# Patient Record
Sex: FEMALE | Race: WHITE | NOT HISPANIC OR LATINO | Employment: FULL TIME | URBAN - METROPOLITAN AREA
[De-identification: names, ages, dates, MRNs, and addresses within clinical notes are randomized per-mention and may not be internally consistent; named-entity substitution may affect disease eponyms.]

---

## 2017-03-23 ENCOUNTER — THERAPY VISIT (OUTPATIENT)
Dept: CHIROPRACTIC MEDICINE | Facility: CLINIC | Age: 52
End: 2017-03-23
Payer: COMMERCIAL

## 2017-03-23 DIAGNOSIS — M79.10 MYALGIA: ICD-10-CM

## 2017-03-23 DIAGNOSIS — M99.05 SOMATIC DYSFUNCTION OF PELVIS REGION: Primary | ICD-10-CM

## 2017-03-23 DIAGNOSIS — M99.06 SOMATIC DYSFUNCTION OF LOWER EXTREMITIES: ICD-10-CM

## 2017-03-23 DIAGNOSIS — M76.60 ACHILLES TENDON PAIN: ICD-10-CM

## 2017-03-23 PROCEDURE — 98940 CHIROPRACT MANJ 1-2 REGIONS: CPT | Mod: AT | Performed by: CHIROPRACTOR

## 2017-03-23 PROCEDURE — 97110 THERAPEUTIC EXERCISES: CPT | Performed by: CHIROPRACTOR

## 2017-03-23 NOTE — MR AVS SNAPSHOT
"              After Visit Summary   3/23/2017    Ale Rose    MRN: [id number]           Patient Information     Date Of Birth          1965        Visit Information        Provider Department      3/23/2017 5:00 PM Hesham Rossi DC [facility] - [place] Chiropractor        Today's Diagnoses     Somatic dysfunction of pelvis region    -  1    Somatic dysfunction of lower extremities        Myalgia        Achilles tendon pain           Follow-ups after your visit        Your next 10 appointments already scheduled     Apr 07, 2017  4:45 PM CDT   [facility] Chiropractor with Hesham Rossi DC   [facility] Athletic [facility] [place] Chiropractor (RAKEL [place])    [address] [place] [address]  [address]  [place] [address] [phone]              Who to contact     If you have questions or need follow up information about today's clinic visit or your schedule please contact [facility] ATHLETIC [facility] [place] CHIROPRACTOR directly at [phone].  Normal or non-critical lab and imaging results will be communicated to you by Curate.Ushart, letter or phone within 4 business days after the clinic has received the results. If you do not hear from us within 7 days, please contact the clinic through Curate.Ushart or phone. If you have a critical or abnormal lab result, we will notify you by phone as soon as possible.  Submit refill requests through We Tribute or call your pharmacy and they will forward the refill request to us. Please allow 3 business days for your refill to be completed.          Additional Information About Your Visit        Curate.Ushart Information     We Tribute lets you send messages to your doctor, view your test results, renew your prescriptions, schedule appointments and more. To sign up, go to [email or web address] . Click on \"Log in\" on the left side of the screen, which will take you to the Welcome page. Then click on \"Sign up Now\" on the right side of " the page.     You will be asked to enter the access code listed below, as well as some personal information. Please follow the directions to create your username and password.     Your access code is: [email or web address]  Expires: 2017  4:20 PM     Your access code will  in 90 days. If you need help or a new code, please call your [place] clinic or [phone].        Care EveryWhere ID     This is your Care EveryWhere ID. This could be used by other organizations to access your [place] medical records  [id number]         Blood Pressure from Last 3 Encounters:   16 128/84   13 112/74   10/25/12 107/67    Weight from Last 3 Encounters:   16 62.6 kg (138 lb)   13 64.9 kg (143 lb)   10/25/12 64.2 kg (141 lb 9.6 oz)              We Performed the Following     CHIROPRAC MANIP,SPINAL,1-2 REGIONS     THERAPEUTIC EXERCISES        Primary Care Provider    Md Other Clinic                Thank you!     Thank you for choosing [place] FOR ATHLETIC MEDICINE [facility] CHIROPRACTOR  for your care. Our goal is always to provide you with excellent care. Hearing back from our patients is one way we can continue to improve our services. Please take a few minutes to complete the written survey that you may receive in the mail after your visit with us. Thank you!             Your Updated Medication List - Protect others around you: Learn how to safely use, store and throw away your medicines at www.disposemymeds.org.          This list is accurate as of: 3/23/17 11:59 PM.  Always use your most recent med list.                   Brand Name Dispense Instructions for use    clindamycin 150 MG capsule    CLEOCIN    21 capsule    Take 1 capsule (150 mg) by mouth 3 times daily       GLUCOSAMINE CHONDR 1500 COMPLX PO      Take 1 tablet by mouth daily.       HYDROcodone-acetaminophen 5-325 MG per tablet    NORCO    30 tablet    Take 1-2 tabs every 6 hours as needed for pain       IBUPROFEN PO           levothyroxine 75 MCG tablet    SYNTHROID/LEVOTHROID     Take 75 mcg by mouth daily.       NASONEX 50 MCG/ACT spray   Generic drug:  mometasone      Spray 2 sprays into both nostrils daily.

## 2017-03-26 NOTE — PROGRESS NOTES
Subjective:  CC: R hamstring, R calf  Visit: 9     Goal: Run back to back days without increase in calf and hamstring tightness, sit for 1 hour before back discomfort   Comes in today doing well. She just raced 10 mile and has another race this weekend. Notes her training has gone very well for [place]. She just had another shock wave treatment and notes that is feeling good. Notes some right hamstring and hip tightness. Overall pleased with progress. Also notes some left lateral hip tightness post run. She has been able to maintain how she has been doing with help of massage therpapy.     Objective:  Inspection:  Moderate thickening over right achilles   No Scars  Early heel lift on right when walking     Palpation:  Palpable soreness over R popliteus, R achilles, R calf, R hamstring  Myofascitis 2/4 noted over R gastroc, R soleus, R hasmtring    ROM:  Hip IR limited on left 5 degrees compared to right  Ankle DF limited on right with mild discomfort on right   Back extension 30/30 with mild lower back pain  Hip adduction full and pain free  SLR symmetric  tightness noted over right hamstring     MMT:  Left glute med 4/5 with no pain  Right glute med 4/5 with no pain  TFL 4/5 on L with lateral hip discomfort  Heel raise 4/5 with pain over right achilles     MET:  Left out flare    NAL:  Restricted SI on right  Restricted talus R    Ortho: SLR (tightness only)    Assessment:  NAL with associated myofascitis and weakness   Achilles tendinitis     Plan:   Patient tolerated treatment well today  Treatment Time: 45 minutes  27859 manipulation 1-2 segments: MET  77333 manipulation: ankle LAD  13306 Manual therapy: (ART, Graston, Strain Counter Strain, Fascial Manipulation, Cupping) performed over area of bilateral hamstrings, R calf, R lateral quad, L TFL  81198 therapeutic exercise (20 minutes):   1. Calf stretching for gastroc and soleus  2. Self plantar stretching  3. Single leg balance on [place] cushion  4. TFL  stretching  5. Lateral hamstring stretching  6. Standing TFL stretching  7. side lying stretching over stability ball, piriformis stretching, marching bridges with ball squeeze   8.  straight leg bridges on stability ball, active isolated hamstring stretches, seated piriformis stretch  9.  only worked lower extremity stretching over quads, hamstrings, TFL, psoas.   10.  hip out flare exercises: left hip abduction, standing running pose with abduction, bridge with abduction, self active isolated stretching. We did review single leg step back and added some insight to make sure she feels it in her glute. Also review current PT exercises   11. she is seeing PT. Today we focused on hip stretching (TFL, hip ER). Condensed exercises down as she notes she is spending 1 hour a day doing them. We focused on stretching and hip extension exercises for glute strength. Added eccentric calfs   12.  single leg balance with opposite arm row, walking lunge with heel raise for balance, single leg paw drill, single leg bridge, figure 4 stretch  Today: single leg bride, clam shell, side lying abduction, lateral band walks  Shock wave 2000 impulses over right achilles 15/8 (did not do today)  Strapping:  Hip IR L, ankle mulligan on R  Next visit: 2 weeks  Dry Needling: achilles and calf (did not do today)

## 2017-04-04 ENCOUNTER — THERAPY VISIT (OUTPATIENT)
Dept: CHIROPRACTIC MEDICINE | Facility: CLINIC | Age: 52
End: 2017-04-04
Payer: COMMERCIAL

## 2017-04-04 DIAGNOSIS — M99.05 SOMATIC DYSFUNCTION OF PELVIS REGION: ICD-10-CM

## 2017-04-04 DIAGNOSIS — M79.10 MYALGIA: ICD-10-CM

## 2017-04-04 DIAGNOSIS — M76.60 ACHILLES TENDON PAIN: ICD-10-CM

## 2017-04-04 DIAGNOSIS — M99.06 SOMATIC DYSFUNCTION OF LOWER EXTREMITIES: ICD-10-CM

## 2017-04-04 PROCEDURE — 97110 THERAPEUTIC EXERCISES: CPT | Performed by: CHIROPRACTOR

## 2017-04-04 PROCEDURE — 98940 CHIROPRACT MANJ 1-2 REGIONS: CPT | Mod: AT | Performed by: CHIROPRACTOR

## 2017-04-04 NOTE — MR AVS SNAPSHOT
"              After Visit Summary   4/4/2017    Ale Rose    MRN: [id number]           Patient Information     Date Of Birth          1965        Visit Information        Provider Department      4/4/2017 4:45 PM Hesham Rossi DC [place] [facility] - [place] Chiropractor        Today's Diagnoses     Somatic dysfunction of pelvis region        Somatic dysfunction of lower extremities        Myalgia        Achilles tendon pain           Follow-ups after your visit        Your next 10 appointments already scheduled     Apr 07, 2017  4:45 PM CDT   [facility] Chiropractor with Hesham Rossi DC   [facility] Athletic [facility] - [place] Chiropractor (RAKEL [place])    [address] [place] [address]  [address]  [place] [address] [phone]              Who to contact     If you have questions or need follow up information about today's clinic visit or your schedule please contact [facility] ATHLETIC [facility] [place] CHIROPRACTOR directly at [phone].  Normal or non-critical lab and imaging results will be communicated to you by saambaahart, letter or phone within 4 business days after the clinic has received the results. If you do not hear from us within 7 days, please contact the clinic through Accumuli Securityt or phone. If you have a critical or abnormal lab result, we will notify you by phone as soon as possible.  Submit refill requests through OneDoc or call your pharmacy and they will forward the refill request to us. Please allow 3 business days for your refill to be completed.          Additional Information About Your Visit        saambaahart Information     OneDoc lets you send messages to your doctor, view your test results, renew your prescriptions, schedule appointments and more. To sign up, go to [email or web address] . Click on \"Log in\" on the left side of the screen, which will take you to the Welcome page. Then click on \"Sign up Now\" on the right side of the " page.     You will be asked to enter the access code listed below, as well as some personal information. Please follow the directions to create your username and password.     Your access code is: [email or web address]  Expires: 2017  4:20 PM     Your access code will  in 90 days. If you need help or a new code, please call your [place] clinic or [phone].        Care EveryWhere ID     This is your Care EveryWhere ID. This could be used by other organizations to access your [place] medical records  [id number]         Blood Pressure from Last 3 Encounters:   16 128/84   13 112/74   10/25/12 107/67    Weight from Last 3 Encounters:   16 62.6 kg (138 lb)   13 64.9 kg (143 lb)   10/25/12 64.2 kg (141 lb 9.6 oz)              Today, you had the following     No orders found for display       Primary Care Provider    Md Other Clinic                Thank you!     Thank you for [facility] FOR [facility]THLETIC MEDICINE [facility] CHIROPRACTOR  for your care. Our goal is always to provide you with excellent care. Hearing back from our patients is one way we can continue to improve our services. Please take a few minutes to complete the written survey that you may receive in the mail after your visit with us. Thank you!             Your Updated Medication List - Protect others around you: Learn how to safely use, store and throw away your medicines at www.disposemymeds.org.          This list is accurate as of: 17  8:18 PM.  Always use your most recent med list.                   Brand Name Dispense Instructions for use    clindamycin 150 MG capsule    CLEOCIN    21 capsule    Take 1 capsule (150 mg) by mouth 3 times daily       GLUCOSAMINE CHONDR 1500 COMPLX PO      Take 1 tablet by mouth daily.       HYDROcodone-acetaminophen 5-325 MG per tablet    NORCO    30 tablet    Take 1-2 tabs every 6 hours as needed for pain       IBUPROFEN PO          levothyroxine 75 MCG tablet     SYNTHROID/LEVOTHROID     Take 75 mcg by mouth daily.       NASONEX 50 MCG/ACT spray   Generic drug:  mometasone      Spray 2 sprays into both nostrils daily.

## 2017-05-16 ENCOUNTER — THERAPY VISIT (OUTPATIENT)
Dept: CHIROPRACTIC MEDICINE | Facility: CLINIC | Age: 52
End: 2017-05-16
Payer: COMMERCIAL

## 2017-05-16 DIAGNOSIS — M99.05 SOMATIC DYSFUNCTION OF PELVIS REGION: Primary | ICD-10-CM

## 2017-05-16 DIAGNOSIS — M99.06 SOMATIC DYSFUNCTION OF LOWER EXTREMITIES: ICD-10-CM

## 2017-05-16 DIAGNOSIS — M79.10 MYALGIA: ICD-10-CM

## 2017-05-16 DIAGNOSIS — M76.60 ACHILLES TENDON PAIN: ICD-10-CM

## 2017-05-16 PROCEDURE — 98940 CHIROPRACT MANJ 1-2 REGIONS: CPT | Mod: AT | Performed by: CHIROPRACTOR

## 2017-05-16 PROCEDURE — 97110 THERAPEUTIC EXERCISES: CPT | Performed by: CHIROPRACTOR

## 2017-05-16 NOTE — MR AVS SNAPSHOT
"              After Visit Summary   5/16/2017    Ale Rose    MRN: [id number]           Patient Information     Date Of Birth          1965        Visit Information        Provider Department      5/16/2017 3:15 PM Hesham Rossi DC [facility] Athletic [facility] - [place] Chiropractor        Today's Diagnoses     Somatic dysfunction of pelvis region    -  1    Somatic dysfunction of lower extremities        Myalgia        Achilles tendon pain           Follow-ups after your visit        Your next 10 appointments already scheduled     May 30, 2017  4:00 PM CDT   [facility] Chiropractor with Hesham Rossi DC   [facility] Athletic [facility] [place] Chiropractor (RAKEL [place])    [address] [place] [address]  [phone]  [place] [address] [phone]              Who to contact     If you have questions or need follow up information about today's clinic visit or your schedule please contact [facility] ATHLETIC [facility] [place] CHIROPRACTOR directly at [phone].  Normal or non-critical lab and imaging results will be communicated to you by Aptanahart, letter or phone within 4 business days after the clinic has received the results. If you do not hear from us within 7 days, please contact the clinic through Aptanahart or phone. If you have a critical or abnormal lab result, we will notify you by phone as soon as possible.  Submit refill requests through [address] or call your pharmacy and they will forward the refill request to us. Please allow 3 business days for your refill to be completed.          Additional Information About Your Visit        Aptanahart Information     [employer] [employer] lets you send messages to your doctor, view your test results, renew your prescriptions, schedule appointments and more. To sign up, go to [email or web address] [email or web address] . Click on \"Log in\" on the left side of the screen, which will take you to the Welcome page. Then click on \"Sign up Now\" on the right side of " the page.     You will be asked to enter the access code listed below, as well as some personal information. Please follow the directions to create your username and password.     Your access code is: [email or web address]  Expires: 2017  4:20 PM     Your access code will  in 90 days. If you need help or a new code, please call your [place] clinic or [phone].        Care EveryWhere ID     This is your Care EveryWhere ID. This could be used by other organizations to access your [place] medical records  [id number]         Blood Pressure from Last 3 Encounters:   16 128/84   13 112/74   10/25/12 107/67    Weight from Last 3 Encounters:   16 62.6 kg (138 lb)   13 64.9 kg (143 lb)   10/25/12 64.2 kg (141 lb 9.6 oz)              We Performed the Following     CHIROPRAC MANIP,SPINAL,1-2 REGIONS     THERAPEUTIC EXERCISES        Primary Care Provider    Md Other Clinic                Thank you!     Thank you for choosing [place] FOR ATHLETIC MEDICINE [facility] CHIROPRACTOR  for your care. Our goal is always to provide you with excellent care. Hearing back from our patients is one way we can continue to improve our services. Please take a few minutes to complete the written survey that you may receive in the mail after your visit with us. Thank you!             Your Updated Medication List - Protect others around you: Learn how to safely use, store and throw away your medicines at www.disposemymeds.org.          This list is accurate as of: 17 11:59 PM.  Always use your most recent med list.                   Brand Name Dispense Instructions for use    clindamycin 150 MG capsule    CLEOCIN    21 capsule    Take 1 capsule (150 mg) by mouth 3 times daily       GLUCOSAMINE CHONDR 1500 COMPLX PO      Take 1 tablet by mouth daily.       HYDROcodone-acetaminophen 5-325 MG per tablet    NORCO    30 tablet    Take 1-2 tabs every 6 hours as needed for pain       IBUPROFEN PO           levothyroxine 75 MCG tablet    SYNTHROID/LEVOTHROID     Take 75 mcg by mouth daily.       NASONEX 50 MCG/ACT spray   Generic drug:  mometasone      Spray 2 sprays into both nostrils daily.

## 2017-05-21 NOTE — PROGRESS NOTES
Subjective:  CC: R hamstring, R calf  Visit: 11    Goal: Run back to back days without increase in calf and hamstring tightness, sit for 1 hour before back discomfort   Comes in today doing well. She finished [email or web address] and has ran 2 other races since. She is very pleased with how she has felt. She notes some left hip tightness, right hamstring and right calf tightness. Achilles is doing well. Has been getting some massages that seem to help as well. Most pain is after speed running and sitting for long periods. She has responded well to care in past. She denies any new issues.     Objective:  Inspection:  Moderate thickening over right achilles   No Scars  Normal gait    Palpation:  Palpable soreness over R popliteus, R achilles, R calf, R hamstring, L lateral hip   Myofascitis 2/4 noted over R gastroc, R soleus, R hamstring, L TFL    ROM:  Hip IR limited on left 5 degrees compared to right  Ankle DF limited on right with mild discomfort on right   Back extension 30/30 with mild lower back pain  Hip adduction full and pain free  SLR symmetric  tightness noted over L hamstring     MMT:  Left glute med 4/5 with no pain  Right glute med 4/5 with no pain  TFL 4/5 on L with lateral hip discomfort  Heel raise 5/5 with pain over right achilles   Hamstrings 5/5 B with mild discomfort over left hamstring     MET:  Left out flare    NAL:  Restricted SI on right  Restricted talus R    Ortho: SLR (tightness only)    Assessment:  NAL with associated myofascitis and weakness   Achilles tendinitis     Plan:   Patient tolerated treatment well today  Treatment Time: 45 minutes  39897 manipulation 1-2 segments: MET  49534 manipulation: ankle LAD  16497 Manual therapy: (ART, Graston, Strain Counter Strain, Fascial Manipulation, Cupping) performed over area of bilateral hamstrings, R calf, R lateral quad, L TFL, B hamstring  42945 therapeutic exercise (20 minutes):   1. Calf stretching for gastroc and soleus  2. Self plantar  stretching  3. Single leg balance on [place] cushion  4. TFL stretching  5. Lateral hamstring stretching  6. Standing TFL stretching  7. side lying stretching over stability ball, piriformis stretching, marching bridges with ball squeeze   8.  straight leg bridges on stability ball, active isolated hamstring stretches, seated piriformis stretch  9.  only worked lower extremity stretching over quads, hamstrings, TFL, psoas.   10.  hip out flare exercises: left hip abduction, standing running pose with abduction, bridge with abduction, self active isolated stretching. We did review single leg step back and added some insight to make sure she feels it in her glute. Also review current PT exercises   11. she is seeing PT. Today we focused on hip stretching (TFL, hip ER). Condensed exercises down as she notes she is spending 1 hour a day doing them. We focused on stretching and hip extension exercises for glute strength. Added eccentric calfs   12.  single leg balance with opposite arm row, walking lunge with heel raise for balance, single leg paw drill, single leg bridge, figure 4 stretch  Today: single leg bride, clam shell, side lying abduction, lateral band walks  Shock wave 2000 impulses over right achilles 15/8 (did not do today)  Strapping:  Hip IR L, ankle mulligan on R  Next visit: PRN, after marathon  Dry Needling: achilles and calf (did not do today)

## 2017-05-30 ENCOUNTER — THERAPY VISIT (OUTPATIENT)
Dept: CHIROPRACTIC MEDICINE | Facility: CLINIC | Age: 52
End: 2017-05-30
Payer: COMMERCIAL

## 2017-05-30 DIAGNOSIS — M99.05 SOMATIC DYSFUNCTION OF PELVIS REGION: Primary | ICD-10-CM

## 2017-05-30 DIAGNOSIS — M76.60 ACHILLES TENDON PAIN: ICD-10-CM

## 2017-05-30 DIAGNOSIS — M99.06 SOMATIC DYSFUNCTION OF LOWER EXTREMITIES: ICD-10-CM

## 2017-05-30 DIAGNOSIS — M79.10 MYALGIA: ICD-10-CM

## 2017-05-30 PROCEDURE — 98940 CHIROPRACT MANJ 1-2 REGIONS: CPT | Mod: AT | Performed by: CHIROPRACTOR

## 2017-05-30 PROCEDURE — 97110 THERAPEUTIC EXERCISES: CPT | Performed by: CHIROPRACTOR

## 2017-05-30 NOTE — MR AVS SNAPSHOT
"              After Visit Summary   2017    Ale Rose    MRN: [id number]           Patient Information     Date Of Birth          1965        Visit Information        Provider Department      2017 4:00 PM Hesham Rossi DC [place] [facility] [place] Chiropractor        Today's Diagnoses     Somatic dysfunction of pelvis region    -  1    Somatic dysfunction of lower extremities        Myalgia        Achilles tendon pain           Follow-ups after your visit        Who to contact     If you have questions or need follow up information about today's clinic visit or your schedule please contact [place] [facility] [place] CHIROPRACTOR directly at [phone].  Normal or non-critical lab and imaging results will be communicated to you by Celeryhart, letter or phone within 4 business days after the clinic has received the results. If you do not hear from us within 7 days, please contact the clinic through Celeryhart or phone. If you have a critical or abnormal lab result, we will notify you by phone as soon as possible.  Submit refill requests through TaskEasy or call your pharmacy and they will forward the refill request to us. Please allow 3 business days for your refill to be completed.          Additional Information About Your Visit        MyChart Information     TaskEasy lets you send messages to your doctor, view your test results, renew your prescriptions, schedule appointments and more. To sign up, go to [email or web address] . Click on \"Log in\" on the left side of the screen, which will take you to the Welcome page. Then click on \"Sign up Now\" on the right side of the page.     You will be asked to enter the access code listed below, as well as some personal information. Please follow the directions to create your username and password.     Your access code is: [email or web address]  Expires: 2017  4:20 PM     Your access code will  in 90 days. " If you need help or a new code, please call your [place] clinic or [phone].        Care EveryWhere ID     This is your Care EveryWhere ID. This could be used by other organizations to access your [place] medical records  [id number]         Blood Pressure from Last 3 Encounters:   01/07/16 128/84   01/03/13 112/74   10/25/12 107/67    Weight from Last 3 Encounters:   01/07/16 62.6 kg (138 lb)   01/03/13 64.9 kg (143 lb)   10/25/12 64.2 kg (141 lb 9.6 oz)              We Performed the Following     CHIROPRAC MANIP,SPINAL,1-2 REGIONS     THERAPEUTIC EXERCISES        Primary Care Provider    Md Other Clinic                Thank you!     Thank you for choosing [place] FOR [facility]IC MED[facility]  for your care. Our goal is always to provide you with excellent care. Hearing back from our patients is one way we can continue to improve our services. Please take a few minutes to complete the written survey that you may receive in the mail after your visit with us. Thank you!             Your Updated Medication List - Protect others around you: Learn how to safely use, store and throw away your medicines at www.disposemymeds.org.          This list is accurate as of: 5/30/17  9:06 PM.  Always use your most recent med list.                   Brand Name Dispense Instructions for use    clindamycin 150 MG capsule    CLEOCIN    21 capsule    Take 1 capsule (150 mg) by mouth 3 times daily       GLUCOSAMINE CHONDR 1500 COMPLX PO      Take 1 tablet by mouth daily.       HYDROcodone-acetaminophen 5-325 MG per tablet    NORCO    30 tablet    Take 1-2 tabs every 6 hours as needed for pain       IBUPROFEN PO          levothyroxine 75 MCG tablet    SYNTHROID/LEVOTHROID     Take 75 mcg by mouth daily.       NASONEX 50 MCG/ACT spray   Generic drug:  mometasone      Spray 2 sprays into both nostrils daily.

## 2017-05-31 NOTE — PROGRESS NOTES
Subjective:  CC: R hamstring, R calf, L hop   Visit: 12    Goal: Run back to back days without increase in calf and hamstring tightness, sit for 1 hour before back discomfort   Comes in today doing well. Notes that she raced 5K yesterday and racing 24 hour race this weekend. Notes was sore last week over left lateral hip from massage, but notes better. Today. Note right hamstring is little tight, but denies any new issues. Notes has been running well. Denies any changes in medications.     Objective:  Inspection:  Moderate thickening over right achilles   No Scars  Normal gait    Palpation:  Palpable soreness over R popliteus, R achilles, R calf, R hamstring, L lateral hip   Myofascitis 2/4 noted over R gastroc, R soleus, R hamstring, L TFL    ROM:  Hip IR limited on left 5 degrees compared to right  Ankle DF limited on right with mild discomfort on right   Back extension 30/30 with mild lower back pain  Hip adduction full and pain free  SLR symmetric  tightness noted over L hamstring     MMT:  Left glute med 4/5 with no pain  Right glute med 4/5 with no pain  TFL 4/5 on L with lateral hip discomfort  Heel raise 5/5 with pain over right achilles   Hamstrings 5/5 B with mild discomfort over left hamstring     MET:  Left out flare    NAL:  Restricted SI on right  Restricted talus R    Ortho: SLR (tightness only)    Assessment:  NAL with associated myofascitis and weakness   Achilles tendinitis     Plan:   Patient tolerated treatment well today  Treatment Time: 45 minutes  49104 manipulation 1-2 segments: MET  21083 manipulation: ankle LAD  78875 Manual therapy: (ART, Graston, Strain Counter Strain, Fascial Manipulation, Cupping) performed over area of bilateral hamstrings, R calf, R lateral quad, L TFL, B hamstring  72340 therapeutic exercise (20 minutes):   1. Calf stretching for gastroc and soleus  2. Self plantar stretching  3. Single leg balance on [place] cushion  4. TFL stretching  5. Lateral hamstring  stretching  6. Standing TFL stretching  7. side lying stretching over stability ball, piriformis stretching, marching bridges with ball squeeze   8.  straight leg bridges on stability ball, active isolated hamstring stretches, seated piriformis stretch  9.  only worked lower extremity stretching over quads, hamstrings, TFL, psoas.   10.  hip out flare exercises: left hip abduction, standing running pose with abduction, bridge with abduction, self active isolated stretching. We did review single leg step back and added some insight to make sure she feels it in her glute. Also review current PT exercises   11. she is seeing PT. Today we focused on hip stretching (TFL, hip ER). Condensed exercises down as she notes she is spending 1 hour a day doing them. We focused on stretching and hip extension exercises for glute strength. Added eccentric calfs   12.  single leg balance with opposite arm row, walking lunge with heel raise for balance, single leg paw drill, single leg bridge, figure 4 stretch  Today: single leg bride, clam shell, side lying abduction, lateral band walks, thoracic rotation   Shock wave 2000 impulses over right achilles 15/8 (did not do today)  Strapping:  Hip IR L, ankle mulligan on R  Next visit: PRN, after marathon  Dry Needling: achilles and calf (did not do today)

## 2017-07-24 ENCOUNTER — THERAPY VISIT (OUTPATIENT)
Dept: CHIROPRACTIC MEDICINE | Facility: CLINIC | Age: 52
End: 2017-07-24
Payer: COMMERCIAL

## 2017-07-24 DIAGNOSIS — M99.06 SOMATIC DYSFUNCTION OF LOWER EXTREMITIES: ICD-10-CM

## 2017-07-24 DIAGNOSIS — M79.10 MYALGIA: ICD-10-CM

## 2017-07-24 DIAGNOSIS — M76.60 ACHILLES TENDON PAIN: ICD-10-CM

## 2017-07-24 DIAGNOSIS — M99.05 SOMATIC DYSFUNCTION OF PELVIS REGION: Primary | ICD-10-CM

## 2017-07-24 PROCEDURE — 97110 THERAPEUTIC EXERCISES: CPT | Performed by: CHIROPRACTOR

## 2017-07-24 PROCEDURE — 98940 CHIROPRACT MANJ 1-2 REGIONS: CPT | Mod: AT | Performed by: CHIROPRACTOR

## 2017-07-24 NOTE — MR AVS SNAPSHOT
After Visit Summary   7/24/2017    Ale Rose    MRN: [id number]           Patient Information     Date Of Birth          1965        Visit Information        Provider Department      7/24/2017 5:45 PM Hesham Rossi DC [facility] [place] Chiropractor        Today's Diagnoses     Somatic dysfunction of pelvis region    -  1    Somatic dysfunction of lower extremities        Myalgia        Achilles tendon pain           Follow-ups after your visit        Your next 10 appointments already scheduled     Aug 15, 2017  9:30 AM CDT   [facility] Chiropractor with Hesham Rossi DC   [facility] [place] [facility] ([facility] [place])    [address] [place] [address]  [phone]  [place] [address]   [phone]            Sep 18, 2017  4:00 PM CDT   RAKEL Chiropractor with Hesham Rossi DC   [facility] [place] Chiropractor ([facility] [place])    [address] [place] [address]  [address]  [place] [address]   [phone]              Who to contact     If you have questions or need follow up information about today's clinic visit or your schedule please contact [facility] directly at [phone].  Normal or non-critical lab and imaging results will be communicated to you by PenBladehart, letter or phone within 4 business days after the clinic has received the results. If you do not hear from us within 7 days, please contact the clinic through Pen[employer] or phone. If you have a critical or abnormal lab result, we will notify you by phone as soon as possible.  Submit refill requests through [employer] or call your pharmacy and they will forward the refill request to us. Please allow 3 business days for your refill to be completed.          Additional Information About Your Visit        PenBladeharRecommend Information     [employer] lets you send messages to your doctor, view your test results, renew  "your prescriptions, schedule appointments and more. To sign up, go to [email or web address] . Click on \"Log in\" on the left side of the screen, which will take you to the Welcome page. Then click on \"Sign up Now\" on the right side of the page.     You will be asked to enter the access code listed below, as well as some personal information. Please follow the directions to create your username and password.     Your access code is: [email or web address]  Expires: 10/22/2017  9:28 PM     Your access code will  in 90 days. If you need help or a new code, please call your [place] clinic or [phone].        Care EveryWhere ID     This is your Care EveryWhere ID. This could be used by other organizations to access your [place] medical records  [id number]         Blood Pressure from Last 3 Encounters:   16 128/84   13 112/74   10/25/12 107/67    Weight from Last 3 Encounters:   16 62.6 kg (138 lb)   13 64.9 kg (143 lb)   10/25/12 64.2 kg (141 lb 9.6 oz)              We Performed the Following     CHIROPRAC MANIP,SPINAL,1-2 REGIONS     THERAPEUTIC EXERCISES        Primary Care Provider    Md Other Clinic                Equal Access to Services     CHEN CERNA : Hadii sarah andujaro Soomaali, waaxda luqadaha, qaybta kaalmada adeegyada, cooper alvarez . So [facility] [phone].    ATENCIÓN: Si habla español, tiene a alonzo disposición servicios gratuitos de asistencia lingüística. Llame al [phone].    We comply with applicable federal civil rights laws and [place] laws. We do not discriminate on the basis of race, color, national origin, age, disability sex, sexual orientation or gender identity.            Thank you!     Thank you for choosing [facility] [place] [facility]  for your care. Our goal is always to provide you with excellent care. Hearing back from our patients is one way we can continue to improve our services. Please take a " few minutes to complete the written survey that you may receive in the mail after your visit with us. Thank you!             Your Updated Medication List - Protect others around you: Learn how to safely use, store and throw away your medicines at www.disposemymeds.org.          This list is accurate as of: 7/24/17  9:28 PM.  Always use your most recent med list.                   Brand Name Dispense Instructions for use Diagnosis    clindamycin 150 MG capsule    CLEOCIN    21 capsule    Take 1 capsule (150 mg) by mouth 3 times daily    Toe infection       GLUCOSAMINE CHONDR 1500 COMPLX PO      Take 1 tablet by mouth daily.        HYDROcodone-acetaminophen 5-325 MG per tablet    NORCO    30 tablet    Take 1-2 tabs every 6 hours as needed for pain    Toe infection, Trochanteric bursitis of left hip       IBUPROFEN PO           levothyroxine 75 MCG tablet    SYNTHROID/LEVOTHROID     Take 75 mcg by mouth daily.        NASONEX 50 MCG/ACT spray   Generic drug:  mometasone      Spray 2 sprays into both nostrils daily.

## 2017-07-25 NOTE — PROGRESS NOTES
Subjective:  CC: R lateral quad  Visit: 14    Goal: Run back to back days without increase in calf and hamstring tightness, sit for 1 hour before back discomfort, train for marathon  Comes in today doing well. Since last visit she has raced ultra and some smaller races. Has been feeling good. She has just increased her training for next marathon. Notes most discomfort after longer runs and quicker runs. Notes most discomfort over R lateral quad and lateral hamstring. Some tightness in right calf. Overall pleased with how she has been feeling.     Objective:  Inspection:  Moderate thickening over right achilles   No Scars  Normal gait    Palpation:  Palpable soreness over R VL,  R calf, R hamstring, L lateral hip   Myofascitis 2/4 noted over R VL, L TFL, R gastroc    ROM:  Hip IR limited on left 5 degrees compared to right  Ankle DF limited on right with no pain  Back extension 30/30 with mild lower back pain  Hip adduction full and pain free  SLR symmetric  tightness noted over R hamstring     MMT:  Left glute med 4/5 with no pain  Right glute med 4/5 with no pain  TFL 4/5 on L with lateral hip discomfort  Heel raise 5/5 with pain over right achilles   Hamstrings 5/5 B with mild discomfort over left hamstring     MET:  Left out flare    NAL:  Restricted SI on right  Restricted talus R    Ortho: SLR (tightness only)    Assessment:  NAL with associated myofascitis and weakness   Achilles tendinitis     Plan:   Patient tolerated treatment well today  Treatment Time: 45 minutes  72016 manipulation 1-2 segments: MET  48344 manipulation: ankle LAD  14576 Manual therapy: (ART, Graston, Strain Counter Strain, Fascial Manipulation, Cupping) performed over area of bilateral hamstrings, R calf, R lateral quad, L TFL, B hamstring  62282 therapeutic exercise (20 minutes):   1. Calf stretching for gastroc and soleus  2. Self plantar stretching  3. Single leg balance on [place] cushion  4. TFL stretching  5. Lateral hamstring  stretching  6. Standing TFL stretching  7. side lying stretching over stability ball, piriformis stretching, marching bridges with ball squeeze   8.  straight leg bridges on stability ball, active isolated hamstring stretches, seated piriformis stretch  9.  only worked lower extremity stretching over quads, hamstrings, TFL, psoas.   10.  hip out flare exercises: left hip abduction, standing running pose with abduction, bridge with abduction, self active isolated stretching. We did review single leg step back and added some insight to make sure she feels it in her glute. Also review current PT exercises   11. she is seeing PT. Today we focused on hip stretching (TFL, hip ER). Condensed exercises down as she notes she is spending 1 hour a day doing them. We focused on stretching and hip extension exercises for glute strength. Added eccentric calfs   12.  single leg balance with opposite arm row, walking lunge with heel raise for balance, single leg paw drill, single leg bridge, figure 4 stretch  Today: single leg bride, clam shell, side lying abduction, lateral band walks  Strapping:  Hip IR L, ankle mulligan on R  Next visit: PRN, after marathon  Dry Needling: achilles and calf (did not do today)

## 2017-08-15 ENCOUNTER — THERAPY VISIT (OUTPATIENT)
Dept: CHIROPRACTIC MEDICINE | Facility: CLINIC | Age: 52
End: 2017-08-15
Payer: COMMERCIAL

## 2017-08-15 DIAGNOSIS — M99.05 SOMATIC DYSFUNCTION OF PELVIS REGION: Primary | ICD-10-CM

## 2017-08-15 DIAGNOSIS — M79.10 MYALGIA: ICD-10-CM

## 2017-08-15 DIAGNOSIS — M25.551 HIP PAIN, RIGHT: ICD-10-CM

## 2017-08-15 PROCEDURE — 98940 CHIROPRACT MANJ 1-2 REGIONS: CPT | Mod: AT | Performed by: CHIROPRACTOR

## 2017-08-15 PROCEDURE — 97110 THERAPEUTIC EXERCISES: CPT | Performed by: CHIROPRACTOR

## 2017-08-15 NOTE — PROGRESS NOTES
Subjective:  CC: B posterior hips and lower back discomfort  Visit: 15  Goal: Speed work without minimal discomfort, train for marathon [facility]  Comes in today doing well. Has been racing a lot this summer and overall has been doing well.   Did long run this weekend and felt good. Notes most discomfort over right lateral leg into quad and hamstring. Notes some tightness in right calf. Achilles is doing well. Notes some B posterior hip discomfort. Pain is 3/10 post running. Denies any radiating pain. Very pleased with progress.     Objective:  Inspection:  mild thickening over right achilles   No Scars  Normal gait    Palpation:  Palpable soreness over R VL,  R calf, R hamstring, B posterior hip  Myofascitis 2/4 noted over R VL, L TFL, R gastroc, B glute med    ROM:  Hip IR limited on left 5 degrees compared to right  Ankle DF limited on right with no pain  Back extension 30/30 with mild lower back pain  Hip adduction full and pain free  SLR symmetric  tightness noted over R hamstring     MMT:  Left glute med 4/5 with no pain  Right glute med 4/5 with no pain  TFL 4/5 on L with lateral hip discomfort  Heel raise 5/5 with pain over right achilles   Hamstrings 5/5 B with mild discomfort over left hamstring     MET:  Left out flare    NAL:  Restricted SI on right    Ortho: SLR (tightness only), sloan and armandoer (-)    Assessment:  NAL with associated myofascitis and weakness     Plan:   Patient tolerated treatment well today  Treatment Time: 45 minutes  72583 manipulation 1-2 segments: MET  09071 manipulation: ankle LAD  79306 Manual therapy: (ART, Graston, Strain Counter Strain, Fascial Manipulation, Cupping) performed over area of bilateral hamstrings, R calf, R lateral quad, L TFL, B hamstring  17010 therapeutic exercise (20 minutes):   1. Calf stretching for gastroc and soleus  2. Self plantar stretching  3. Single leg balance on [place] cushion  4. TFL stretching  5. Lateral hamstring stretching  6.  Standing TFL stretching  7. side lying stretching over stability ball, piriformis stretching, marching bridges with ball squeeze   8.  straight leg bridges on stability ball, active isolated hamstring stretches, seated piriformis stretch  9.  only worked lower extremity stretching over quads, hamstrings, TFL, psoas.   10.  hip out flare exercises: left hip abduction, standing running pose with abduction, bridge with abduction, self active isolated stretching. We did review single leg step back and added some insight to make sure she feels it in her glute. Also review current PT exercises   11. she is seeing PT. Today we focused on hip stretching (TFL, hip ER). Condensed exercises down as she notes she is spending 1 hour a day doing them. We focused on stretching and hip extension exercises for glute strength. Added eccentric calfs   12.  single leg balance with opposite arm row, walking lunge with heel raise for balance, single leg paw drill, single leg bridge, figure 4 stretch  Today: single leg bride, clam shell, side lying abduction, lateral band walks  Strapping:  Hip IR L, ankle mulligan on R  Next visit: PRN, after marathon  Dry Needling: achilles and calf (did not do today)

## 2017-08-15 NOTE — MR AVS SNAPSHOT
"              After Visit Summary   8/15/2017    Ale Rose    MRN: [id number]           Patient Information     Date Of Birth          1965        Visit Information        Provider Department      8/15/2017 9:30 AM Hesham Rossi DC [facility] [place] Chiropractor        Today's Diagnoses     Somatic dysfunction of pelvis region    -  1    Hip pain, right        Myalgia           Follow-ups after your visit        Your next 10 appointments already scheduled     Sep 18, 2017  4:00 PM CDT   [facility] Chiropractor with Hesham Rossi DC   [facility] Athletic [facility] [place] Chiropractor ([facility] [place])    [address] [place] [address]  [address]  [place] [address] [phone]              Who to contact     If you have questions or need follow up information about today's clinic visit or your schedule please contact [facility] [place] CHIROPRACTOR directly at [phone].  Normal or non-critical lab and imaging results will be communicated to you by Symphony Dynamohart, letter or phone within 4 business days after the clinic has received the results. If you do not hear from us within 7 days, please contact the clinic through [employer] or phone. If you have a critical or abnormal lab result, we will notify you by phone as soon as possible.  Submit refill requests through [employer] or call your pharmacy and they will forward the refill request to us. Please allow 3 business days for your refill to be completed.          Additional Information About Your Visit        [employer] [facility] [place] Information     [employer] lets you send messages to your doctor, view your test results, renew your prescriptions, schedule appointments and more. To sign up, go to [email or web address] . Click on \"Log in\" on the left side of the screen, which will take you to the Welcome page. Then click on \"Sign up Now\" on the right side of the page.     You will be asked to enter the access " code listed below, as well as some personal information. Please follow the directions to create your username and password.     Your access code is: [email or web address]  Expires: 10/22/2017  9:28 PM     Your access code will  in 90 days. If you need help or a new code, please call your [place] clinic or [phone].        Care EveryWhere ID     This is your Care EveryWhere ID. This could be used by other organizations to access your [place] medical records  [id number]         Blood Pressure from Last 3 Encounters:   16 128/84   13 112/74   10/25/12 107/67    Weight from Last 3 Encounters:   16 62.6 kg (138 lb)   13 64.9 kg (143 lb)   10/25/12 64.2 kg (141 lb 9.6 oz)              We Performed the Following     CHIROPRAC MANIP,SPINAL,1-2 REGIONS     THERAPEUTIC EXERCISES        Primary Care Provider    Md Other Clinic                Equal Access to Services     CHEN CERNA : Hadii aad ku hadasho Sokristan, waaxda luqadaha, qaybta kaalmada adeegyada, waxay steven alvarez . So [facility] [phone].    ATENCIÓN: Si habla español, tiene a alonzo disposición servicios gratuitos de asistencia lingüística. Llame al [phone].    We comply with applicable federal civil rights laws and [place] laws. We do not discriminate on the basis of race, color, national origin, age, disability sex, sexual orientation or gender identity.            Thank you!     Thank you for choosing [facility]  for your care. Our goal is always to provide you with excellent care. Hearing back from our patients is one way we can continue to improve our services. Please take a few minutes to complete the written survey that you may receive in the mail after your visit with us. Thank you!             Your Updated Medication List - Protect others around you: Learn how to safely use, store and throw away your medicines at www.disposemymeds.org.          This list  is accurate as of: 8/15/17 11:59 PM.  Always use your most recent med list.                   Brand Name Dispense Instructions for use Diagnosis    clindamycin 150 MG capsule    CLEOCIN    21 capsule    Take 1 capsule (150 mg) by mouth 3 times daily    Toe infection       GLUCOSAMINE CHONDR 1500 COMPLX PO      Take 1 tablet by mouth daily.        HYDROcodone-acetaminophen 5-325 MG per tablet    NORCO    30 tablet    Take 1-2 tabs every 6 hours as needed for pain    Toe infection, Trochanteric bursitis of left hip       IBUPROFEN PO           levothyroxine 75 MCG tablet    SYNTHROID/LEVOTHROID     Take 75 mcg by mouth daily.        NASONEX 50 MCG/ACT spray   Generic drug:  mometasone      Spray 2 sprays into both nostrils daily.

## 2017-08-20 PROBLEM — M25.551 HIP PAIN, RIGHT: Status: ACTIVE | Noted: 2017-08-20

## 2017-08-20 PROBLEM — M79.10 MYALGIA: Status: ACTIVE | Noted: 2017-08-20

## 2017-08-20 PROBLEM — M99.05 SOMATIC DYSFUNCTION OF PELVIS REGION: Status: ACTIVE | Noted: 2017-08-20

## 2017-09-18 ENCOUNTER — THERAPY VISIT (OUTPATIENT)
Dept: CHIROPRACTIC MEDICINE | Facility: CLINIC | Age: 52
End: 2017-09-18
Payer: COMMERCIAL

## 2017-09-18 DIAGNOSIS — M79.10 MYALGIA: ICD-10-CM

## 2017-09-18 DIAGNOSIS — M99.05 SOMATIC DYSFUNCTION OF PELVIS REGION: Primary | ICD-10-CM

## 2017-09-18 DIAGNOSIS — M25.551 HIP PAIN, RIGHT: ICD-10-CM

## 2017-09-18 PROCEDURE — 98940 CHIROPRACT MANJ 1-2 REGIONS: CPT | Mod: AT | Performed by: CHIROPRACTOR

## 2017-09-18 PROCEDURE — 97110 THERAPEUTIC EXERCISES: CPT | Performed by: CHIROPRACTOR

## 2017-09-18 NOTE — PROGRESS NOTES
Subjective:  CC: B posterior hips and lower back discomfort  Visit: 16  Goal: Speed work without minimal discomfort, train for marathon [facility]  Changed shoes. Right quad. Saw massage therapy (2) since last visit. Has been racing. Notes working on right quad. Notes has been doing ice bath. Notes right adductor tightness. Denies any other issues. Denies any new issues. Last massage appointment was last week. Notes now starting taper time. Last long run was yesterday and 15. Very pleased with how she has been feeling.     Objective:  Inspection:  mild thickening over right achilles   No Scars  Normal gait    Palpation:  Palpable soreness over R VL,  R calf, R hamstring, B quad  Myofascitis 2/4 noted over R VL, L TFL, R gastroc, R lateral quad    ROM:  Hip IR limited on left 5 degrees compared to right  Ankle DF limited on right with no pain  Back extension 30/30 with mild lower back pain  Hip adduction full and pain free  SLR symmetric  tightness noted over R hamstring   Knee flexion full and pain free    MMT:  Left glute med 4/5 with no pain  Right glute med 4/5 with no pain  TFL 4/5 on L with lateral hip discomfort  Heel raise 5/5 with pain over right achilles   Hamstrings 5/5 B with mild discomfort over left hamstring     MET:  Left out flare    NAL:  Restricted SI on right    Ortho: SLR (tightness only), sloan and fader (-)    Assessment:  NAL with associated myofascitis and weakness     Plan:   Patient tolerated treatment well today  Treatment Time: 45 minutes  41001 manipulation 1-2 segments: MET  62630 manipulation: ankle LAD  84131 Manual therapy: (ART, Graston, Strain Counter Strain, Fascial Manipulation, Cupping) performed over area of bilateral hamstrings, R calf, R lateral quad, L TFL, B hamstring  45887 therapeutic exercise (20 minutes):   1. Calf stretching for gastroc and soleus  2. Self plantar stretching  3. Single leg balance on [place] cushion  4. TFL stretching  5. Lateral hamstring  stretching  6. Standing TFL stretching  7. side lying stretching over stability ball, piriformis stretching, marching bridges with ball squeeze   8.  straight leg bridges on stability ball, active isolated hamstring stretches, seated piriformis stretch  9.  only worked lower extremity stretching over quads, hamstrings, TFL, psoas.   10.  hip out flare exercises: left hip abduction, standing running pose with abduction, bridge with abduction, self active isolated stretching. We did review single leg step back and added some insight to make sure she feels it in her glute. Also review current PT exercises   11. she is seeing PT. Today we focused on hip stretching (TFL, hip ER). Condensed exercises down as she notes she is spending 1 hour a day doing them. We focused on stretching and hip extension exercises for glute strength. Added eccentric calfs   12.  single leg balance with opposite arm row, walking lunge with heel raise for balance, single leg paw drill, single leg bridge, figure 4 stretch  Today: single leg bride, clam shell, side lying abduction, lateral band walks  Strapping:  Hip IR L, ankle mulligan on R  Next visit: PRN, after marathon  Dry Needling: achilles and calf (did not do today)

## 2017-09-18 NOTE — MR AVS SNAPSHOT
"              After Visit Summary   2017    Ale Rose    MRN: [id number]           Patient Information     Date Of Birth          1965        Visit Information        Provider Department      2017 4:00 PM Hesham Rossi DC [facility] [place] [facility]        Today's Diagnoses     Somatic dysfunction of pelvis region    -  1    Hip pain, right        Myalgia           Follow-ups after your visit        Who to contact     If you have questions or need follow up information about today's clinic visit or your schedule please contact [place] [facility] directly at [phone].  Normal or non-critical lab and imaging results will be communicated to you by [employer] Semiconductorhart, letter or phone within 4 business days after the clinic has received the results. If you do not hear from us within 7 days, please contact the clinic through [employer] or phone. If you have a critical or abnormal lab result, we will notify you by phone as soon as possible.  Submit refill requests through Qlibri or call your pharmacy and they will forward the refill request to us. Please allow 3 business days for your refill to be completed.          Additional Information About Your Visit        MyChart Information     Qlibri lets you send messages to your doctor, view your test results, renew your prescriptions, schedule appointments and more. To sign up, go to [email or web address] . Click on \"Log in\" on the left side of the screen, which will take you to the Welcome page. Then click on \"Sign up Now\" on the right side of the page.     You will be asked to enter the access code listed below, as well as some personal information. Please follow the directions to create your username and password.     Your access code is: [email or web address]  Expires: 10/22/2017  9:28 PM     Your access code will  in 90 days. If you need help or a new code, please call your " [facility] or [phone].        Care EveryWhere ID     This is your Care EveryWhere ID. This could be used by other organizations to access your [place] medical records  [id number]         Blood Pressure from Last 3 Encounters:   01/07/16 128/84   01/03/13 112/74   10/25/12 107/67    Weight from Last 3 Encounters:   01/07/16 62.6 kg (138 lb)   01/03/13 64.9 kg (143 lb)   10/25/12 64.2 kg (141 lb 9.6 oz)              We Performed the Following     CHIROPRAC MANIP,SPINAL,1-2 REGIONS     THERAPEUTIC EXERCISES        Primary Care Provider    Provider Not In System                Equal Access to Services     CHEN CERNA : Anoop Barnhart, wakrissy lombardi, pancho kaalmada donaldo, cooper alvarez . So [facility] [phone].    ATENCIÓN: Si habla español, tiene a alonzo disposición servicios gratuitos de asistencia lingüística. Llame al [phone].    We comply with applicable federal civil rights laws and [place] laws. We do not discriminate on the basis of race, color, national origin, age, disability sex, sexual orientation or gender identity.            Thank you!     Thank you for choosing [facility]OPRA[facility]  for your care. Our goal is always to provide you with excellent care. Hearing back from our patients is one way we can continue to improve our services. Please take a few minutes to complete the written survey that you may receive in the mail after your visit with us. Thank you!             Your Updated Medication List - Protect others around you: Learn how to safely use, store and throw away your medicines at www.disposemymeds.org.          This list is accurate as of: 9/18/17 10:05 PM.  Always use your most recent med list.                   Brand Name Dispense Instructions for use Diagnosis    clindamycin 150 MG capsule    CLEOCIN    21 capsule    Take 1 capsule (150 mg) by mouth 3 times daily    Toe infection        GLUCOSAMINE CHONDR 1500 COMPLX PO      Take 1 tablet by mouth daily.        HYDROcodone-acetaminophen 5-325 MG per tablet    NORCO    30 tablet    Take 1-2 tabs every 6 hours as needed for pain    Toe infection, Trochanteric bursitis of left hip       IBUPROFEN PO           levothyroxine 75 MCG tablet    SYNTHROID/LEVOTHROID     Take 75 mcg by mouth daily.        NASONEX 50 MCG/ACT spray   Generic drug:  mometasone      Spray 2 sprays into both nostrils daily.

## 2017-10-23 ENCOUNTER — THERAPY VISIT (OUTPATIENT)
Dept: CHIROPRACTIC MEDICINE | Facility: CLINIC | Age: 52
End: 2017-10-23
Payer: COMMERCIAL

## 2017-10-23 DIAGNOSIS — M99.05 SOMATIC DYSFUNCTION OF PELVIS REGION: Primary | ICD-10-CM

## 2017-10-23 DIAGNOSIS — M25.551 HIP PAIN, RIGHT: ICD-10-CM

## 2017-10-23 DIAGNOSIS — M79.10 MYALGIA: ICD-10-CM

## 2017-10-23 PROCEDURE — 97110 THERAPEUTIC EXERCISES: CPT | Performed by: CHIROPRACTOR

## 2017-10-23 PROCEDURE — 98940 CHIROPRACT MANJ 1-2 REGIONS: CPT | Mod: AT | Performed by: CHIROPRACTOR

## 2017-10-23 NOTE — PROGRESS NOTES
Subjective:  CC: B posterior hips and lower back discomfort  Visit: 17  Goal: Speed work without minimal discomfort, train for marathon [employer]  Changed shoes. Right quad. Saw massage therapy (2) since last visit. Has been racing. Notes working on right quad. Notes has been doing ice bath. Notes right adductor tightness. Denies any other issues. Ran marathon couple weeks ago. Denies any new issues. Running tunnel hills 100 in 3 weeks. Notes doing some cross training week after. Notes that she is gettng some massage that seems to be helping. Biggest tightness is over right lateral quad. Denies any new issues.     Objective:  Inspection:  mild thickening over right achilles   No Scars  Normal gait    Palpation:  Palpable soreness over R VL,  R calf, R hamstring, B quad  Myofascitis 2/4 noted over R VL, L TFL, R gastroc, R lateral quad    ROM:  Hip IR limited on left 5 degrees compared to right  Ankle DF limited on right with no pain  Back extension 30/30 with mild lower back pain  Hip adduction full and pain free  SLR symmetric  tightness noted over R hamstring   Knee flexion full and pain free    MMT:  Left glute med 4/5 with no pain  Right glute med 4/5 with no pain  TFL 4/5 on L with lateral hip discomfort  Heel raise 5/5 with pain over right achilles   Hamstrings 5/5 B with mild discomfort over left hamstring     MET:  Left out flare    NAL:  Restricted SI on right    Ortho: SLR (tightness only), sloan and armandoer (-)    Assessment:  NAL with associated myofascitis and weakness     Plan:   Patient tolerated treatment well today  Treatment Time: 45 minutes  82192 manipulation 1-2 segments: MET  79767 manipulation: ankle LAD  54702 Manual therapy: (ART, Graston, Strain Counter Strain, Fascial Manipulation, Cupping) performed over area of bilateral hamstrings, R calf, R lateral quad, L TFL, B hamstring  45121 therapeutic exercise (20 minutes):   1. Calf stretching for gastroc and soleus  2. Self plantar  stretching  3. Single leg balance on [place] cushion  4. TFL stretching  5. Lateral hamstring stretching  6. Standing TFL stretching  7. side lying stretching over stability ball, piriformis stretching, marching bridges with ball squeeze   8.  straight leg bridges on stability ball, active isolated hamstring stretches, seated piriformis stretch  9.  only worked lower extremity stretching over quads, hamstrings, TFL, psoas.   10.  hip out flare exercises: left hip abduction, standing running pose with abduction, bridge with abduction, self active isolated stretching. We did review single leg step back and added some insight to make sure she feels it in her glute. Also review current PT exercises   11. she is seeing PT. Today we focused on hip stretching (TFL, hip ER). Condensed exercises down as she notes she is spending 1 hour a day doing them. We focused on stretching and hip extension exercises for glute strength. Added eccentric calfs   12.  single leg balance with opposite arm row, walking lunge with heel raise for balance, single leg paw drill, single leg bridge, figure 4 stretch  Today: single leg bride, clam shell, side lying abduction, lateral band walks  Strapping:  Hip IR L, ankle mulligan on R  Next visit: PRN, after marathon  Dry Needling: achilles and calf (did not do today)

## 2017-10-23 NOTE — MR AVS SNAPSHOT
After Visit Summary   10/23/2017    Ale Rose    MRN: [id number]           Patient Information     Date Of Birth          1965        Visit Information        Provider Department      10/23/2017 1:00 PM Hesham Rossi DC [facility] [place] [facility]rop[facility]        Today's Diagnoses     Somatic dysfunction of pelvis region    -  1    Hip pain, right        Myalgia           Follow-ups after your visit        Your next 10 appointments already scheduled     Nov 06, 2017  1:00 PM CST   [facility] Chiropractor with Hesham Rossi DC   [facility] [place] [facility]rop[facility] ([facility] [place])    [address] [place] [address]  [address]  [place] [id number] [phone]            Nov 20, 2017  1:00 PM CST   [facility] Chiropractor with Hesham Rossi DC   [facility] [place] [facility]rop[facility] ([facility] [place])    [address] [place] [address]  [phone]  [place] [id number] [phone]              Who to contact     If you have questions or need follow up information about today's clinic visit or your schedule please contact [facility] directly at [phone].  Normal or non-critical lab and imaging results will be communicated to you by [employer], letter or phone within 4 business days after the clinic has received the results. If you do not hear from us within 7 days, please contact the clinic through [employer] or phone. If you have a critical or abnormal lab result, we will notify you by phone as soon as possible.  Submit refill requests through [employer] or call your pharmacy and they will forward the refill request to us. Please allow 3 business days for your refill to be completed.          Additional Information About Your Visit        [employer]Pronota Information     I[employer] Apps lets you send messages to your doctor, view your test results, renew your prescriptions, schedule appointments and more.  "To sign up, go to [email or web address] . Click on \"Log in\" on the left side of the screen, which will take you to the Welcome page. Then click on \"Sign up Now\" on the right side of the page.     You will be asked to enter the access code listed below, as well as some personal information. Please follow the directions to create your username and password.     Your access code is: [email or web address]  Expires: 2018  9:18 PM     Your access code will  in 90 days. If you need help or a new code, please call your [place] clinic or [phone].        Care EveryWhere ID     This is your Care EveryWhere ID. This could be used by other organizations to access your [place] medical records  [id number]         Blood Pressure from Last 3 Encounters:   16 128/84   13 112/74   10/25/12 107/67    Weight from Last 3 Encounters:   16 62.6 kg (138 lb)   13 64.9 kg (143 lb)   10/25/12 64.2 kg (141 lb 9.6 oz)              We Performed the Following     CHIROPRAC MANIP,SPINAL,1-2 REGIONS     THERAPEUTIC EXERCISES        Primary Care Provider Fax #    Provider Not In System [phone]                Equal Access to Services     CHEN CERNA : Hadii sarah andujaro Sokristan, waaxda luqadaha, qaybta kaalmada adeegyada, cooper alvarez . So [facility] [phone].    ATENCIÓN: Si habla español, tiene a alonzo disposición servicios gratuitos de asistencia lingüística. Llame al [phone].    We comply with applicable federal civil rights laws and [place] laws. We do not discriminate on the basis of race, color, national origin, age, disability, sex, sexual orientation, or gender identity.            Thank you!     Thank you for choosing [place] [facility]  for your care. Our goal is always to provide you with excellent care. Hearing back from our patients is one way we can continue to improve our services. Please take a few minutes to complete " the written survey that you may receive in the mail after your visit with us. Thank you!             Your Updated Medication List - Protect others around you: Learn how to safely use, store and throw away your medicines at www.disposemymeds.org.          This list is accurate as of: 10/23/17  9:18 PM.  Always use your most recent med list.                   Brand Name Dispense Instructions for use Diagnosis    clindamycin 150 MG capsule    CLEOCIN    21 capsule    Take 1 capsule (150 mg) by mouth 3 times daily    Toe infection       GLUCOSAMINE CHONDR 1500 COMPLX PO      Take 1 tablet by mouth daily.        HYDROcodone-acetaminophen 5-325 MG per tablet    NORCO    30 tablet    Take 1-2 tabs every 6 hours as needed for pain    Toe infection, Trochanteric bursitis of left hip       IBUPROFEN PO           levothyroxine 75 MCG tablet    SYNTHROID/LEVOTHROID     Take 75 mcg by mouth daily.        NASONEX 50 MCG/ACT spray   Generic drug:  mometasone      Spray 2 sprays into both nostrils daily.

## 2017-11-06 ENCOUNTER — THERAPY VISIT (OUTPATIENT)
Dept: CHIROPRACTIC MEDICINE | Facility: CLINIC | Age: 52
End: 2017-11-06
Payer: COMMERCIAL

## 2017-11-06 DIAGNOSIS — M25.551 HIP PAIN, RIGHT: ICD-10-CM

## 2017-11-06 DIAGNOSIS — M79.10 MYALGIA: ICD-10-CM

## 2017-11-06 DIAGNOSIS — M99.05 SOMATIC DYSFUNCTION OF PELVIS REGION: Primary | ICD-10-CM

## 2017-11-06 PROCEDURE — 97110 THERAPEUTIC EXERCISES: CPT | Performed by: CHIROPRACTOR

## 2017-11-06 PROCEDURE — 98940 CHIROPRACT MANJ 1-2 REGIONS: CPT | Mod: AT | Performed by: CHIROPRACTOR

## 2017-11-06 NOTE — PROGRESS NOTES
Subjective:  CC: B posterior hips and lower back discomfort  Visit: 18  Goal: Speed work without minimal discomfort, train for marathon [facility] [place]  Comes in today doing very well. Notes felt very good after last session. Raced 1/2 marathon 1.5 weeks ago and felt very good. Notes that has 100 mile race this weekend. She notes some left lateral hip tightness and denies any new changes in health history.     Objective:  Inspection:  mild thickening over right achilles   No Scars  Normal gait    Palpation:  Palpable soreness over R VL,  R calf, R hamstring, L TFL  Myofascitis 2/4 noted over R VL, L TFL, R gastroc, R lateral quad    ROM:  Hip IR limited on left 5 degrees compared to right  Ankle DF limited on right with no pain  Back extension 30/30 with mild lower back pain  Hip adduction full and pain free  SLR symmetric  tightness noted over R hamstring   Knee flexion full and pain free    MMT:  Left glute med 4/5 with no pain  Right glute med 4/5 with no pain  TFL 4/5 on L with lateral hip discomfort  Heel raise 5/5 with pain over right achilles   Hamstrings 5/5 B with mild discomfort over left hamstring     MET:  Left out flare    NAL:  Restricted SI on right    Ortho: SLR (tightness only), sloan and armandoer (-)    Assessment:  NAL with associated myofascitis and weakness     Plan:   Patient tolerated treatment well today  Treatment Time: 45 minutes  41248 manipulation 1-2 segments: MET  03239 manipulation: ankle LAD  27552 Manual therapy: (ART, Graston, Strain Counter Strain, Fascial Manipulation, Cupping) performed over area of bilateral hamstrings, R calf, R lateral quad, L TFL, B hamstring  81433 therapeutic exercise (20 minutes):   1. Calf stretching for gastroc and soleus  2. Self plantar stretching  3. Single leg balance on [place] cushion  4. TFL stretching  5. Lateral hamstring stretching  6. Standing TFL stretching  7. side lying stretching over stability ball, piriformis stretching, marching  bridges with ball squeeze   8.  straight leg bridges on stability ball, active isolated hamstring stretches, seated piriformis stretch  9.  only worked lower extremity stretching over quads, hamstrings, TFL, psoas.   10.  hip out flare exercises: left hip abduction, standing running pose with abduction, bridge with abduction, self active isolated stretching. We did review single leg step back and added some insight to make sure she feels it in her glute. Also review current PT exercises   11. she is seeing PT. Today we focused on hip stretching (TFL, hip ER). Condensed exercises down as she notes she is spending 1 hour a day doing them. We focused on stretching and hip extension exercises for glute strength. Added eccentric calfs   12.  single leg balance with opposite arm row, walking lunge with heel raise for balance, single leg paw drill, single leg bridge, figure 4 stretch  Today: single leg bride, clam shell, side lying abduction, lateral band walks  Strapping:  Hip IR L, ankle mulligan on R  Next visit: PRN, after marathon  Dry Needling: achilles and calf (did not do today)

## 2017-11-06 NOTE — MR AVS SNAPSHOT
"              After Visit Summary   11/6/2017    Ale Rose    MRN: [id number]           Patient Information     Date Of Birth          1965        Visit Information        Provider Department      11/6/2017 1:00 PM Hesham Rossi DC [place] [facility] - [place] [facility]        Today's Diagnoses     Somatic dysfunction of pelvis region    -  1    Hip pain, right        Myalgia           Follow-ups after your visit        Your next 10 appointments already scheduled     Nov 20, 2017  1:00 PM [facility] Chiropractor with Hesham Rossi DC   [facility] [place] Chiropractor ([facility] [place])    [address] [place] [address]  [address]  [place] [address] [phone]              Who to contact     If you have questions or need follow up information about today's clinic visit or your schedule please contact [facility] [place] [facility] directly at [phone].  Normal or non-critical lab and imaging results will be communicated to you by [employer]ar.ruhart, letter or phone within 4 business days after the clinic has received the results. If you do not hear from us within 7 days, please contact the clinic through [employer] or phone. If you have a critical or abnormal lab result, we will notify you by phone as soon as possible.  Submit refill requests through [email or web address] or call your pharmacy and they will forward the refill request to us. Please allow 3 business days for your refill to be completed.          Additional Information About Your Visit        Webinar.[email or web address] Information     [email or web address] lets you send messages to your doctor, view your test results, renew your prescriptions, schedule appointments and more. To sign up, go to [email or web address] . Click on \"Log in\" on the left side of the screen, which will take you to the Welcome page. Then click on \"Sign up Now\" on the right side of the page.     You will be asked to enter the access " code listed below, as well as some personal information. Please follow the directions to create your username and password.     Your access code is: [email or web address]  Expires: 2018  8:18 PM     Your access code will  in 90 days. If you need help or a new code, please call your [facility] or [phone].        Care EveryWhere ID     This is your Care EveryWhere ID. This could be used by other organizations to access your [place] medical records  [id number]         Blood Pressure from Last 3 Encounters:   16 128/84   13 112/74   10/25/12 107/67    Weight from Last 3 Encounters:   16 62.6 kg (138 lb)   13 64.9 kg (143 lb)   10/25/12 64.2 kg (141 lb 9.6 oz)              We Performed the Following     CHIROPRAC MANIP,SPINAL,1-2 REGIONS     THERAPEUTIC EXERCISES        Primary Care Provider Fax #    Provider Not In System [phone]                Equal Access to Services     [facility]RUNAL [facility] : Hadii aad ku hadasho Soomaali, waaxda luqadaha, qaybta kaalmada adeegyada, cooper alvarez . So [facility] [phone].    ATENCIÓN: Si habla español, tiene a alonzo disposición servicios gratuitos de asistencia lingüística. Llame al [phone].    We comply with applicable federal civil rights laws and [place] laws. We do not discriminate on the basis of race, color, national origin, age, disability, sex, sexual orientation, or gender identity.            Thank you!     Thank you for choosing [facility] [place] [facility]  for your care. Our goal is always to provide you with excellent care. Hearing back from our patients is one way we can continue to improve our services. Please take a few minutes to complete the written survey that you may receive in the mail after your visit with us. Thank you!             Your Updated Medication List - Protect others around you: Learn how to safely use, store and throw away your medicines at  www.disposemymeds.org.          This list is accurate as of: 11/6/17  2:11 PM.  Always use your most recent med list.                   Brand Name Dispense Instructions for use Diagnosis    clindamycin 150 MG capsule    CLEOCIN    21 capsule    Take 1 capsule (150 mg) by mouth 3 times daily    Toe infection       GLUCOSAMINE CHONDR 1500 COMPLX PO      Take 1 tablet by mouth daily.        HYDROcodone-acetaminophen 5-325 MG per tablet    NORCO    30 tablet    Take 1-2 tabs every 6 hours as needed for pain    Toe infection, Trochanteric bursitis of left hip       IBUPROFEN PO           levothyroxine 75 MCG tablet    SYNTHROID/LEVOTHROID     Take 75 mcg by mouth daily.        NASONEX 50 MCG/ACT spray   Generic drug:  mometasone      Spray 2 sprays into both nostrils daily.

## 2017-11-20 ENCOUNTER — THERAPY VISIT (OUTPATIENT)
Dept: CHIROPRACTIC MEDICINE | Facility: CLINIC | Age: 52
End: 2017-11-20
Payer: COMMERCIAL

## 2017-11-20 DIAGNOSIS — M79.10 MYALGIA: ICD-10-CM

## 2017-11-20 DIAGNOSIS — M25.551 HIP PAIN, RIGHT: ICD-10-CM

## 2017-11-20 DIAGNOSIS — M99.05 SOMATIC DYSFUNCTION OF PELVIS REGION: Primary | ICD-10-CM

## 2017-11-20 PROCEDURE — 98940 CHIROPRACT MANJ 1-2 REGIONS: CPT | Mod: AT | Performed by: CHIROPRACTOR

## 2017-11-20 PROCEDURE — 97110 THERAPEUTIC EXERCISES: CPT | Performed by: CHIROPRACTOR

## 2017-11-20 NOTE — MR AVS SNAPSHOT
"              After Visit Summary   2017    Ale Rose    MRN: [id number]           Patient Information     Date Of Birth          1965        Visit Information        Provider Department      2017 1:00 PM Hesham Rossi DC [facility] [place] [facility]        Today's Diagnoses     Somatic dysfunction of pelvis region    -  1    Hip pain, right        Myalgia           Follow-ups after your visit        Who to contact     If you have questions or need follow up information about today's clinic visit or your schedule please contact [facility] directly at [phone].  Normal or non-critical lab and imaging results will be communicated to you by Vizuryhart, letter or phone within 4 business days after the clinic has received the results. If you do not hear from us within 7 days, please contact the clinic through Vizuryhart or phone. If you have a critical or abnormal lab result, we will notify you by phone as soon as possible.  Submit refill requests through Yuntaa or call your pharmacy and they will forward the refill request to us. Please allow 3 business days for your refill to be completed.          Additional Information About Your Visit        MyChart Information     Yuntaa lets you send messages to your doctor, view your test results, renew your prescriptions, schedule appointments and more. To sign up, go to [email or web address] . Click on \"Log in\" on the left side of the screen, which will take you to the Welcome page. Then click on \"Sign up Now\" on the right side of the page.     You will be asked to enter the access code listed below, as well as some personal information. Please follow the directions to create your username and password.     Your access code is: [email or web address]  Expires: 2018  8:18 PM     Your access code will  in 90 days. If you need help or a new code, please call your " [facility] or [phone].        Care EveryWhere ID     This is your Care EveryWhere ID. This could be used by other organizations to access your [facility] medical records  [id number]         Blood Pressure from Last 3 Encounters:   01/07/16 128/84   01/03/13 112/74   10/25/12 107/67    Weight from Last 3 Encounters:   01/07/16 62.6 kg (138 lb)   01/03/13 64.9 kg (143 lb)   10/25/12 64.2 kg (141 lb 9.6 oz)              We Performed the Following     CHIROPRAC MANIP,SPINAL,1-2 REGIONS     THERAPEUTIC EXERCISES        Primary Care Provider    Physician No Ref-Primary       NO REF-PRIMARY PHYSICIAN        Equal Access to Services     CHEN CERNA : Anoop Barnhart, lamar lombardi, pancho kaalmaboo fulton, cooper alvarez . So [facility] [phone].    ATENCIÓN: Si habla español, tiene a alonzo disposición servicios gratuitos de asistencia lingüística. Llame al [phone].    We comply with applicable federal civil rights laws and [place] laws. We do not discriminate on the basis of race, color, national origin, age, disability, sex, sexual orientation, or gender identity.            Thank you!     Thank you for choosing [facility]  [facility] your care. Our goal is always to provide you with excellent care. Hearing back from our patients is one way we can continue to improve our services. Please take a few minutes to complete the written survey that you may receive in the mail after your visit with us. Thank you!             Your Updated Medication List - Protect others around you: Learn how to safely use, store and throw away your medicines at www.disposemymeds.org.          This list is accurate as of: 11/20/17  8:41 PM.  Always use your most recent med list.                   Brand Name Dispense Instructions for use Diagnosis    clindamycin 150 MG capsule    CLEOCIN    21 capsule    Take 1 capsule (150 mg) by mouth 3 times daily     Toe infection       GLUCOSAMINE CHONDR 1500 COMPLX PO      Take 1 tablet by mouth daily.        HYDROcodone-acetaminophen 5-325 MG per tablet    NORCO    30 tablet    Take 1-2 tabs every 6 hours as needed for pain    Toe infection, Trochanteric bursitis of left hip       IBUPROFEN PO           levothyroxine 75 MCG tablet    SYNTHROID/LEVOTHROID     Take 75 mcg by mouth daily.        NASONEX 50 MCG/ACT spray   Generic drug:  mometasone      Spray 2 sprays into both nostrils daily.

## 2017-11-21 NOTE — PROGRESS NOTES
Subjective:  CC: B posterior hips and lower back discomfort  Visit: 19  Goal: Speed work without minimal discomfort, train for marathon [employer] [place]  Comes in today doing very well. Raced 100 mile ultra little over week ago. Went very well. Noted was very sore after race in general, but denies any specific pain. States has taken couple days off or running and now has been doing aqua jogging as lower back and mid back are tight. States hips are tight, but overall doing better. Lower legs feel good. Notes sitting for long periods makes her back more sore. She is taking break of active care but will start again in 1 week when starts running again.     Objective:  Inspection:  mild thickening over right achilles   No Scars  Normal gait    Palpation:  Palpable soreness over R VL,  R calf, R hamstring, L TFL  Myofascitis 2/4 noted over R VL, L TFL, R gastroc, R lateral quad    ROM:  Hip IR limited on left 5 degrees compared to right  Ankle DF limited on right with no pain  Back extension 30/30 with mild lower back pain  Hip adduction full and pain free  SLR symmetric  tightness noted over R hamstring   Knee flexion full and pain free    MMT:  Left glute med 4/5 with no pain  Right glute med 4/5 with no pain  TFL 4/5 on L with lateral hip discomfort  Heel raise 5/5 with pain over right achilles   Hamstrings 5/5 B with mild discomfort over left hamstring     MET:  Left out flare    NAL:  Restricted SI on right    Ortho: SLR (tightness only), sloan and fader (-)    Assessment:  NAL with associated myofascitis and weakness     Plan:   Patient tolerated treatment well today  Treatment Time: 45 minutes  52766 manipulation 1-2 segments: MET  59626 manipulation: ankle LAD  30905 Manual therapy: (ART, Graston, Strain Counter Strain, Fascial Manipulation, Cupping) performed over area of bilateral hamstrings, R calf, R lateral quad, L TFL, B hamstring  62499 therapeutic exercise (20 minutes):   1. Calf stretching for gastroc  and soleus  2. Self plantar stretching  3. Single leg balance on [place] cushion  4. TFL stretching  5. Lateral hamstring stretching  6. Standing TFL stretching  7. side lying stretching over stability ball, piriformis stretching, marching bridges with ball squeeze   8.  straight leg bridges on stability ball, active isolated hamstring stretches, seated piriformis stretch  9.  only worked lower extremity stretching over quads, hamstrings, TFL, psoas.   10.  hip out flare exercises: left hip abduction, standing running pose with abduction, bridge with abduction, self active isolated stretching. We did review single leg step back and added some insight to make sure she feels it in her glute. Also review current PT exercises   11. she is seeing PT. Today we focused on hip stretching (TFL, hip ER). Condensed exercises down as she notes she is spending 1 hour a day doing them. We focused on stretching and hip extension exercises for glute strength. Added eccentric calfs   12.  single leg balance with opposite arm row, walking lunge with heel raise for balance, single leg paw drill, single leg bridge, figure 4 stretch  Today: single leg bride, clam shell, side lying abduction, lateral band walks (Today only did hip and lower back stretching)  Strapping:  Hip IR L, ankle mulligan on R  Next visit: PRN, after marathon

## 2018-02-05 ENCOUNTER — THERAPY VISIT (OUTPATIENT)
Dept: CHIROPRACTIC MEDICINE | Facility: CLINIC | Age: 53
End: 2018-02-05
Payer: COMMERCIAL

## 2018-02-05 DIAGNOSIS — M79.10 MYALGIA: ICD-10-CM

## 2018-02-05 DIAGNOSIS — M99.05 SOMATIC DYSFUNCTION OF PELVIS REGION: Primary | ICD-10-CM

## 2018-02-05 DIAGNOSIS — M25.551 HIP PAIN, RIGHT: ICD-10-CM

## 2018-02-05 PROCEDURE — 98940 CHIROPRACT MANJ 1-2 REGIONS: CPT | Performed by: CHIROPRACTOR

## 2018-02-05 PROCEDURE — 97110 THERAPEUTIC EXERCISES: CPT | Performed by: CHIROPRACTOR

## 2018-02-12 NOTE — PROGRESS NOTES
Subjective:  CC: B posterior hips and lower back discomfort  Visit: 20  Goal: Speed work without minimal discomfort, train for marathon [facility] [place]  Comes in today doing very well. Now training for [place] [employer] and doing some speed work. Notes overall doing well. Has some B hip tightness and B calf tightness. Achilles is feeling good. Denies any new issues. Denies any radiating pain. Working on active care program. Pleased with progress and able to feel pretty good with just active care program. Pain is 3/10 after running    Objective:  Inspection:  mild thickening over right achilles   No Scars  Normal gait    Palpation:  Palpable soreness over R VL,  R calf, R hamstring, L TFL  Myofascitis 2/4 noted over R VL, L TFL, R gastroc, B hip ER's    ROM:  Hip IR limited on left 5 degrees compared to right  Ankle DF limited on right with no pain  Back extension 30/30 with mild lower back pain  Hip adduction full and pain free  SLR symmetric  tightness noted over R hamstring   Knee flexion full and pain free    MMT:  Left glute med 4/5 with no pain  Right glute med 4/5 with no pain  TFL 4/5 on L with lateral hip discomfort  Heel raise 5/5 with pain over right achilles   Hamstrings 5/5 B with mild discomfort over left hamstring     MET:  Left out flare    NAL:  Restricted SI on right    Ortho: SLR (tightness only), sloan and fader (-)    Assessment:  NAL with associated myofascitis and weakness     Plan:   Patient tolerated treatment well today  Treatment Time: 45 minutes  14051 manipulation 1-2 segments: MET  89057 manipulation: ankle LAD  81560 Manual therapy: (ART, Graston, Strain Counter Strain, Fascial Manipulation, Cupping) performed over area of bilateral hamstrings, R calf, R lateral quad, L TFL, B hamstring  54486 therapeutic exercise (20 minutes):   1. Calf stretching for gastroc and soleus  2. Self plantar stretching  3. Single leg balance on [place] cushion  4. TFL stretching  5. Lateral hamstring  stretching  6. Standing TFL stretching  7. side lying stretching over stability ball, piriformis stretching, marching bridges with ball squeeze   8.  straight leg bridges on stability ball, active isolated hamstring stretches, seated piriformis stretch  9.  only worked lower extremity stretching over quads, hamstrings, TFL, psoas.   10.  hip out flare exercises: left hip abduction, standing running pose with abduction, bridge with abduction, self active isolated stretching. We did review single leg step back and added some insight to make sure she feels it in her glute. Also review current PT exercises   11. she is seeing PT. Today we focused on hip stretching (TFL, hip ER). Condensed exercises down as she notes she is spending 1 hour a day doing them. We focused on stretching and hip extension exercises for glute strength. Added eccentric calfs   12.  single leg balance with opposite arm row, walking lunge with heel raise for balance, single leg paw drill, single leg bridge, figure 4 stretch  Today: single leg bride, clam shell, side lying abduction, lateral band walks (Today only did hip and lower back stretching)  Strapping:  Hip IR L, ankle mulligan on R  Next visit: PRN

## 2018-02-22 ENCOUNTER — THERAPY VISIT (OUTPATIENT)
Dept: CHIROPRACTIC MEDICINE | Facility: CLINIC | Age: 53
End: 2018-02-22
Payer: COMMERCIAL

## 2018-02-22 DIAGNOSIS — M99.05 SOMATIC DYSFUNCTION OF PELVIS REGION: Primary | ICD-10-CM

## 2018-02-22 DIAGNOSIS — M25.551 HIP PAIN, RIGHT: ICD-10-CM

## 2018-02-22 DIAGNOSIS — M79.10 MYALGIA: ICD-10-CM

## 2018-02-22 PROCEDURE — 98940 CHIROPRACT MANJ 1-2 REGIONS: CPT | Performed by: CHIROPRACTOR

## 2018-02-22 PROCEDURE — 97110 THERAPEUTIC EXERCISES: CPT | Performed by: CHIROPRACTOR

## 2018-02-23 NOTE — PROGRESS NOTES
Subjective:  CC: B posterior hips and lower back discomfort  Visit: 21  Goal: Speed work without minimal discomfort, train for [employer]     Comes in today doing very well. Racing a 50K this weekend. Notes most discomfort over B hamstrings and posterior hips. Denies any radiating pain. Movement usually helps. Her training and active care is going well. Pain is minimal at 3/10 post run. Denies any new issues since last session.   Objective:  Inspection:  mild thickening over right achilles   No Scars  Normal gait    Palpation:  Palpable soreness over B hamstrings, L posterior hip   Myofascitis 2/4 noted over B hamstrings, B hip ER's    ROM:  Hip IR limited on left 5 degrees compared to right  Ankle DF limited on right with no pain  Back extension 30/30 with mild lower back pain  Hip adduction full and pain free  SLR symmetric  tightness noted over R hamstring   Knee flexion full and pain free    MMT:  Left glute med 4/5 with no pain  Right glute med 4/5 with no pain  TFL 4/5 on L with lateral hip discomfort  Heel raise 5/5 with pain over right achilles   Hamstrings 5/5 B with mild discomfort over left hamstring     MET:  Left out flare    NAL:  Restricted SI on right    Ortho: SLR (tightness only), sloan and armandoer (-)    Assessment:  NAL with associated myofascitis and weakness     Plan:   Patient tolerated treatment well today  Treatment Time: 45 minutes  11394 manipulation 1-2 segments: MET  32301 manipulation: ankle LAD  11845 Manual therapy: (ART, Graston, Strain Counter Strain, Fascial Manipulation, Cupping) performed over area of bilateral hamstrings, R calf, R lateral quad, L TFL, B hamstring  72884 therapeutic exercise (20 minutes):   1. Calf stretching for gastroc and soleus  2. Self plantar stretching  3. Single leg balance on [place] cushion  4. TFL stretching  5. Lateral hamstring stretching  6. Standing TFL stretching  7. side lying stretching over stability ball, piriformis stretching, marching  bridges with ball squeeze   8.  straight leg bridges on stability ball, active isolated hamstring stretches, seated piriformis stretch  9.  only worked lower extremity stretching over quads, hamstrings, TFL, psoas.   10.  hip out flare exercises: left hip abduction, standing running pose with abduction, bridge with abduction, self active isolated stretching. We did review single leg step back and added some insight to make sure she feels it in her glute. Also review current PT exercises   11. she is seeing PT. Today we focused on hip stretching (TFL, hip ER). Condensed exercises down as she notes she is spending 1 hour a day doing them. We focused on stretching and hip extension exercises for glute strength. Added eccentric calfs   12.  single leg balance with opposite arm row, walking lunge with heel raise for balance, single leg paw drill, single leg bridge, figure 4 stretch  Today: single leg bride, clam shell, side lying abduction, lateral band walks, cat / cow stretching  Strapping:  Hip IR L  Next visit: PRN

## 2018-03-12 ENCOUNTER — THERAPY VISIT (OUTPATIENT)
Dept: CHIROPRACTIC MEDICINE | Facility: CLINIC | Age: 53
End: 2018-03-12
Payer: COMMERCIAL

## 2018-03-12 DIAGNOSIS — M99.05 SOMATIC DYSFUNCTION OF PELVIS REGION: Primary | ICD-10-CM

## 2018-03-12 DIAGNOSIS — M79.10 MYALGIA: ICD-10-CM

## 2018-03-12 DIAGNOSIS — M25.551 HIP PAIN, RIGHT: ICD-10-CM

## 2018-03-12 PROCEDURE — 97110 THERAPEUTIC EXERCISES: CPT | Performed by: CHIROPRACTOR

## 2018-03-12 PROCEDURE — 98940 CHIROPRACT MANJ 1-2 REGIONS: CPT | Performed by: CHIROPRACTOR

## 2018-03-13 NOTE — PROGRESS NOTES
Subjective:  CC: B posterior hips and lower back discomfort  Visit: 22  Goal: Speed work without minimal discomfort, [facility] for [employer]     Comes in today doing very well. Her 50K was very muddy so had to run on roads. States that felt good. Tight over R lateral hip and had massage to help. She raced this last weekend and felt flat. Denies any specific pain. Pleased with progress she has made  Objective:  Inspection:  mild thickening over right achilles   No Scars  Normal gait    Palpation:  Palpable soreness over B hamstrings, L posterior hip, R TFL  Myofascitis 2/4 noted over B hamstrings, B hip ER's, R TFL    ROM:  Hip IR limited on left 5 degrees compared to right  Ankle DF limited on right with no pain  Back extension 30/30 with mild lower back pain  Hip adduction full and pain free  SLR symmetric  tightness noted over R hamstring   Knee flexion full and pain free    MMT:  Left glute med 4/5 with no pain  Right glute med 4/5 with no pain  TFL 4/5 on L with lateral hip discomfort  Heel raise 5/5 with pain over right achilles   Hamstrings 5/5 B with mild discomfort over left hamstring     MET:  Left out flare    NAL:  Restricted SI on right    Ortho: SLR (tightness only), sloan and ashley (-)    Assessment:  NAL with associated myofascitis and weakness     Plan:   Patient tolerated treatment well today  Treatment Time: 45 minutes  45972 manipulation 1-2 segments: MET  21731 manipulation: ankle LAD  77445 Manual therapy: (ART, Graston, Strain Counter Strain, Fascial Manipulation, Cupping) performed over area of bilateral hamstrings, R calf, R lateral quad, L TFL, B hamstring  82917 therapeutic exercise (20 minutes):   1. Calf stretching for gastroc and soleus  2. Self plantar stretching  3. Single leg balance on [place] cushion  4. TFL stretching  5. Lateral hamstring stretching  6. Standing TFL stretching  7. side lying stretching over stability ball, piriformis stretching, marching bridges with ball squeeze    8.  straight leg bridges on stability ball, active isolated hamstring stretches, seated piriformis stretch  9.  only worked lower extremity stretching over quads, hamstrings, TFL, psoas.   10.  hip out flare exercises: left hip abduction, standing running pose with abduction, bridge with abduction, self active isolated stretching. We did review single leg step back and added some insight to make sure she feels it in her glute. Also review current PT exercises   11. she is seeing PT. Today we focused on hip stretching (TFL, hip ER). Condensed exercises down as she notes she is spending 1 hour a day doing them. We focused on stretching and hip extension exercises for glute strength. Added eccentric calfs   12.  single leg balance with opposite arm row, walking lunge with heel raise for balance, single leg paw drill, single leg bridge, figure 4 stretch  Today: single leg bride, clam shell, side lying abduction, lateral band walks, cat / cow stretching  Strapping:  Hip IR L  Next visit: PRN

## 2018-03-26 ENCOUNTER — THERAPY VISIT (OUTPATIENT)
Dept: CHIROPRACTIC MEDICINE | Facility: CLINIC | Age: 53
End: 2018-03-26
Payer: COMMERCIAL

## 2018-03-26 DIAGNOSIS — M25.551 HIP PAIN, RIGHT: ICD-10-CM

## 2018-03-26 DIAGNOSIS — M99.05 SOMATIC DYSFUNCTION OF PELVIS REGION: Primary | ICD-10-CM

## 2018-03-26 DIAGNOSIS — M79.10 MYALGIA: ICD-10-CM

## 2018-03-26 PROCEDURE — 97110 THERAPEUTIC EXERCISES: CPT | Performed by: CHIROPRACTOR

## 2018-03-26 PROCEDURE — 98940 CHIROPRACT MANJ 1-2 REGIONS: CPT | Mod: AT | Performed by: CHIROPRACTOR

## 2018-03-26 NOTE — PROGRESS NOTES
Subjective:  CC: B posterior hips and lower back discomfort  Visit: 23  Goal: Speed work without minimal discomfort, train for [employer]     Comes in today doing well. Notes feeling pretty good and raced 10 miler this weekend and did 22 as long run. Notes massage ([facility]) said more left hip tightness. Feels off and restricted in lower back. Left lateral hip tightness. Feels less smooth while running. Denies any new issues.     Objective:  Inspection:  mild thickening over right achilles   No Scars  Normal gait    Palpation:  Palpable soreness over B hamstrings, L posterior hip, LTFL  Myofascitis 2/4 noted over B hamstrings, B hip ER's, R TFL    ROM:  Hip IR limited on left 5 degrees compared to right  Ankle DF limited on right with no pain  Back extension 30/30 with mild lower back pain  Hip adduction full and pain free  SLR symmetric  tightness noted over R hamstring   Knee flexion full and pain free    MMT:  Left glute med 4/5 with no pain  Right glute med 4/5 with no pain  TFL 4/5 on L with lateral hip discomfort  Heel raise 5/5 with pain over right achilles   Hamstrings 5/5 B with mild discomfort over left hamstring     MET:  Left out flare    NAL:  Restricted SI on right    Ortho: SLR (tightness only), sloan and ashley (-)    Assessment:  NAL with associated myofascitis and weakness     Plan:   Patient tolerated treatment well today  Treatment Time: 45 minutes  87655 manipulation 1-2 segments: MET  29939 manipulation: ankle LAD  68928 Manual therapy: (ART, Graston, Strain Counter Strain, Fascial Manipulation, Cupping) performed over area of bilateral hamstrings, R calf, R lateral quad, L TFL, B hamstring  82490 therapeutic exercise (20 minutes):   1. Calf stretching for gastroc and soleus  2. Self plantar stretching  3. Single leg balance on [place] cushion  4. TFL stretching  5. Lateral hamstring stretching  6. Standing TFL stretching  7. side lying stretching over stability ball, piriformis stretching,  marching bridges with ball squeeze   8.  straight leg bridges on stability ball, active isolated hamstring stretches, seated piriformis stretch  9.  only worked lower extremity stretching over quads, hamstrings, TFL, psoas.   10.  hip out flare exercises: left hip abduction, standing running pose with abduction, bridge with abduction, self active isolated stretching. We did review single leg step back and added some insight to make sure she feels it in her glute. Also review current PT exercises   11. she is seeing PT. Today we focused on hip stretching (TFL, hip ER). Condensed exercises down as she notes she is spending 1 hour a day doing them. We focused on stretching and hip extension exercises for glute strength. Added eccentric calfs   12.  single leg balance with opposite arm row, walking lunge with heel raise for balance, single leg paw drill, single leg bridge, figure 4 stretch  Today: single leg bride, clam shell, side lying abduction, lateral band walks, cat / cow stretching  Strapping:  Hip IR L  Next visit: PRN

## 2018-04-09 ENCOUNTER — THERAPY VISIT (OUTPATIENT)
Dept: CHIROPRACTIC MEDICINE | Facility: CLINIC | Age: 53
End: 2018-04-09
Payer: COMMERCIAL

## 2018-04-09 DIAGNOSIS — M25.551 HIP PAIN, RIGHT: ICD-10-CM

## 2018-04-09 DIAGNOSIS — M99.05 SOMATIC DYSFUNCTION OF PELVIS REGION: Primary | ICD-10-CM

## 2018-04-09 DIAGNOSIS — M79.10 MYALGIA: ICD-10-CM

## 2018-04-09 PROCEDURE — 97110 THERAPEUTIC EXERCISES: CPT | Performed by: CHIROPRACTOR

## 2018-04-09 PROCEDURE — 98940 CHIROPRACT MANJ 1-2 REGIONS: CPT | Mod: AT | Performed by: CHIROPRACTOR

## 2018-04-09 NOTE — PROGRESS NOTES
Subjective:  CC: B posterior hips and lower back discomfort  Visit: 24  Goal: Speed work without minimal discomfort, train for [employer] [place]     Comes in today doing well. Races [place] on Monday. Has been feeling good since last session. She is working on active care program and had a couple massages. Notes that R lateral leg and R calf are tight. Denies any new issues. Training has gone full go. Active care is going well and denies any new issues in health history.     Objective:  Inspection:  mild thickening over right achilles   No Scars  Normal gait    Palpation:  Palpable soreness over B hamstrings, R VL, R calf  Myofascitis 2/4 noted over B hamstrings, R VL, R Calf    ROM:  Hip IR limited on left 5 degrees compared to right  Ankle DF limited on right with no pain  Back extension 30/30 with mild lower back pain  Hip adduction full and pain free  SLR symmetric  tightness noted over R hamstring   Knee flexion full and pain free    MMT:  Left glute med 4/5 with no pain  Right glute med 4/5 with no pain  TFL 4/5 on L with lateral hip discomfort  Heel raise 5/5 with pain over right achilles   Hamstrings 5/5 B with mild discomfort over left hamstring     MET:  Left out flare    NAL:  Restricted SI on right    Ortho: SLR (tightness only), sloan and armandoer (-)    Assessment:  NAL with associated myofascitis and weakness     Plan:   Patient tolerated treatment well today  Treatment Time: 45 minutes  16499 manipulation 1-2 segments: MET  11451 manipulation: ankle LAD  13673 Manual therapy: (ART, Graston, Strain Counter Strain, Fascial Manipulation, Cupping) performed over area of bilateral hamstrings, R calf, R lateral quad, L TFL, B hamstring  69596 therapeutic exercise (20 minutes):   1. Calf stretching for gastroc and soleus  2. Self plantar stretching  3. Single leg balance on [place] cushion  4. TFL stretching  5. Lateral hamstring stretching  6. Standing TFL stretching  7. side lying stretching over stability ball,  piriformis stretching, marching bridges with ball squeeze   8.  straight leg bridges on stability ball, active isolated hamstring stretches, seated piriformis stretch  9.  only worked lower extremity stretching over quads, hamstrings, TFL, psoas.   10.  hip out flare exercises: left hip abduction, standing running pose with abduction, bridge with abduction, self active isolated stretching. We did review single leg step back and added some insight to make sure she feels it in her glute. Also review current PT exercises   11. she is seeing PT. Today we focused on hip stretching (TFL, hip ER). Condensed exercises down as she notes she is spending 1 hour a day doing them. We focused on stretching and hip extension exercises for glute strength. Added eccentric calfs   12.  single leg balance with opposite arm row, walking lunge with heel raise for balance, single leg paw drill, single leg bridge, figure 4 stretch  Today: single leg bride, clam shell, side lying abduction, lateral band walks, cat / cow stretching  Strapping:  Hip IR L  Next visit: PRN

## 2018-04-23 ENCOUNTER — THERAPY VISIT (OUTPATIENT)
Dept: CHIROPRACTIC MEDICINE | Facility: CLINIC | Age: 53
End: 2018-04-23
Payer: COMMERCIAL

## 2018-04-23 DIAGNOSIS — M99.05 SOMATIC DYSFUNCTION OF PELVIS REGION: ICD-10-CM

## 2018-04-23 DIAGNOSIS — M25.551 HIP PAIN, RIGHT: ICD-10-CM

## 2018-04-23 DIAGNOSIS — M79.10 MYALGIA: ICD-10-CM

## 2018-04-23 PROCEDURE — 97110 THERAPEUTIC EXERCISES: CPT | Performed by: CHIROPRACTOR

## 2018-04-23 PROCEDURE — 98940 CHIROPRACT MANJ 1-2 REGIONS: CPT | Mod: AT | Performed by: CHIROPRACTOR

## 2018-04-23 NOTE — PROGRESS NOTES
Subjective:  CC: B posterior hips and lower back discomfort  Visit: 25  Goal: Speed work without minimal discomfort, train for [employer]   Comes in today week after [place]. Had to drop out of race at 22 due to hypothermia. Notes was stuck in tent for about 6 hour and was freezing so lower back and hips are very tight. She is taking off this week of running. She is little sick and went to MD and diagnosed with URI and was given Z pack and decongestants. Denies any radiating pain. Notes some right lateral tight tightness. Pain is 3/10 with sitting for long periods. She is still doing some stretching but I told her to hold off on the exercises until next week. Denies any other changes in health history.     Objective:  Inspection:  mild thickening over right achilles   No Scars  Normal gait    Palpation:  Palpable soreness over B hamstrings, R VL, general lower back  Myofascitis 2/4 noted over B hamstrings, R VL, LPS    ROM:  Hip IR limited on left 5 degrees compared to right  Ankle DF limited on right with no pain  Back extension 30/30 with mild lower back pain  Hip adduction full and pain free  SLR symmetric  tightness noted over R hamstring   Knee flexion full and pain free    MMT:  Left glute med 4/5 with no pain  Right glute med 4/5 with no pain  TFL 4/5 on L with lateral hip discomfort  Heel raise 5/5 with pain over right achilles   Hamstrings 5/5 B with mild discomfort over left hamstring     MET:  Left out flare    NAL:  Restricted SI on right    Ortho: SLR (tightness only), sloan and armandoer (-)    Assessment:  NAL with associated myofascitis and weakness     Plan:   Patient tolerated treatment well today  Treatment Time: 45 minutes  36475 manipulation 1-2 segments: MET  07250 manipulation: ankle LAD  88704 Manual therapy: (ART, Graston, Strain Counter Strain, Fascial Manipulation, Cupping) performed over area of bilateral hamstrings, R calf, R lateral quad, L TFL, B hamstring  61216 therapeutic exercise  (20 minutes):   1. Calf stretching for gastroc and soleus  2. Self plantar stretching  3. Single leg balance on [place] cushion  4. TFL stretching  5. Lateral hamstring stretching  6. Standing TFL stretching  7. side lying stretching over stability ball, piriformis stretching, marching bridges with ball squeeze   8.  straight leg bridges on stability ball, active isolated hamstring stretches, seated piriformis stretch  9.  only worked lower extremity stretching over quads, hamstrings, TFL, psoas.   10.  hip out flare exercises: left hip abduction, standing running pose with abduction, bridge with abduction, self active isolated stretching. We did review single leg step back and added some insight to make sure she feels it in her glute. Also review current PT exercises   11. she is seeing PT. Today we focused on hip stretching (TFL, hip ER). Condensed exercises down as she notes she is spending 1 hour a day doing them. We focused on stretching and hip extension exercises for glute strength. Added eccentric calfs   12.  single leg balance with opposite arm row, walking lunge with heel raise for balance, single leg paw drill, single leg bridge, figure 4 stretch  Today: single leg bride, clam shell, side lying abduction, lateral band walks, cat / cow stretching  Strapping:  Hip IR L  Next visit: PRN

## 2018-05-14 ENCOUNTER — THERAPY VISIT (OUTPATIENT)
Dept: CHIROPRACTIC MEDICINE | Facility: CLINIC | Age: 53
End: 2018-05-14
Payer: COMMERCIAL

## 2018-05-14 DIAGNOSIS — M79.10 MYALGIA: ICD-10-CM

## 2018-05-14 DIAGNOSIS — M99.05 SOMATIC DYSFUNCTION OF PELVIS REGION: Primary | ICD-10-CM

## 2018-05-14 DIAGNOSIS — M25.551 HIP PAIN, RIGHT: ICD-10-CM

## 2018-05-14 PROCEDURE — 97110 THERAPEUTIC EXERCISES: CPT | Performed by: CHIROPRACTOR

## 2018-05-14 PROCEDURE — 98940 CHIROPRACT MANJ 1-2 REGIONS: CPT | Mod: AT | Performed by: CHIROPRACTOR

## 2018-05-17 NOTE — PROGRESS NOTES
Subjective:  CC: B posterior hips and lower back discomfort  Visit: 26  Goal: Speed work without minimal discomfort, train for [place]   Comes in today doing better. Back running and raced a mile last week. Starting to train for another marathon in June. Notes that still run down as diagnosed from URI. Notes that has gotten massage that helped. Notes started some shock wave on right achilles just as preventative. Notes some right calf tightness, R hamstring tightness. Back is doing well. Denies any new issues. Working on active care program.     Objective:  Inspection:  mild thickening over right achilles   No Scars  Normal gait    Palpation:  Palpable soreness over B hamstrings, R VL, general lower back  Myofascitis 2/4 noted over B hamstrings, R VL, LPS    ROM:  Hip IR limited on left 5 degrees compared to right  Ankle DF limited on right with no pain  Back extension 30/30 with mild lower back pain  Hip adduction full and pain free  SLR symmetric  tightness noted over R hamstring   Knee flexion full and pain free    MMT:  Left glute med 4/5 with no pain  Right glute med 4/5 with no pain  TFL 4/5 on L with lateral hip discomfort  Heel raise 5/5 with pain over right achilles   Hamstrings 5/5 B with mild discomfort over left hamstring     MET:  Left out flare    NAL:  Restricted SI on right    Ortho: SLR (tightness only), sloan and armandoer (-)    Assessment:  NAL with associated myofascitis and weakness     Plan:   Patient tolerated treatment well today  Treatment Time: 45 minutes  28724 manipulation 1-2 segments: MET  85001 manipulation: ankle LAD  62498 Manual therapy: (ART, Graston, Strain Counter Strain, Fascial Manipulation, Cupping) performed over area of bilateral hamstrings, R calf, R lateral quad, L TFL, B hamstring  64567 therapeutic exercise (20 minutes):   1. Calf stretching for gastroc and soleus  2. Self plantar stretching  3. Single leg balance on [place] cushion  4. TFL stretching  5. Lateral  hamstring stretching  6. Standing TFL stretching  7. side lying stretching over stability ball, piriformis stretching, marching bridges with ball squeeze   8.  straight leg bridges on stability ball, active isolated hamstring stretches, seated piriformis stretch  9.  only worked lower extremity stretching over quads, hamstrings, TFL, psoas.   10.  hip out flare exercises: left hip abduction, standing running pose with abduction, bridge with abduction, self active isolated stretching. We did review single leg step back and added some insight to make sure she feels it in her glute. Also review current PT exercises   11. she is seeing PT. Today we focused on hip stretching (TFL, hip ER). Condensed exercises down as she notes she is spending 1 hour a day doing them. We focused on stretching and hip extension exercises for glute strength. Added eccentric calfs   12.  single leg balance with opposite arm row, walking lunge with heel raise for balance, single leg paw drill, single leg bridge, figure 4 stretch  Today: single leg bride, clam shell, side lying abduction, lateral band walks, cat / cow stretching  Strapping:  Hip IR L  Next visit: PRN as does well with active care program

## 2018-07-02 ENCOUNTER — THERAPY VISIT (OUTPATIENT)
Dept: CHIROPRACTIC MEDICINE | Facility: CLINIC | Age: 53
End: 2018-07-02
Payer: COMMERCIAL

## 2018-07-02 DIAGNOSIS — M79.10 MYALGIA: ICD-10-CM

## 2018-07-02 DIAGNOSIS — M99.05 SOMATIC DYSFUNCTION OF PELVIS REGION: ICD-10-CM

## 2018-07-02 DIAGNOSIS — M25.551 HIP PAIN, RIGHT: ICD-10-CM

## 2018-07-02 PROCEDURE — 97110 THERAPEUTIC EXERCISES: CPT | Performed by: CHIROPRACTOR

## 2018-07-02 PROCEDURE — 98940 CHIROPRACT MANJ 1-2 REGIONS: CPT | Mod: AT | Performed by: CHIROPRACTOR

## 2018-07-05 NOTE — PROGRESS NOTES
Subjective:  CC: B posterior hips and lower back discomfort  Visit: 27  Goal: Speed work without minimal discomfort, train for [employer]   Comes in today doing better. Since last visit did a 24 hour race and did 120 miles. Felt very good. Took off some time and is now getting back into things. Notes that starting to do some shorter races again and the speed work is causing some right hamstring and hip tightness. Back is tight but better with active care. Running is full go. Getting occasional massages. Sitting for more than 30  minutes makes pain worse. Pain is 3/10 on average. Denies any new issues or new changes in health history.     Objective:  Inspection:  mild thickening over right achilles   No Scars  Normal gait    Palpation:  Palpable soreness over B hamstrings, R VL, general lower back  Myofascitis 2/4 noted over B hamstrings, R VL, LPS    ROM:  Hip IR limited on left 5 degrees compared to right  Ankle DF limited on right with no pain  Back extension 30/30 with mild lower back pain  Hip adduction full and pain free  SLR symmetric  tightness noted over R hamstring   Knee flexion full and pain free    MMT:  Left glute med 4/5 with no pain  Right glute med 4/5 with no pain  TFL 4/5 on L with lateral hip discomfort  Heel raise 5/5 with pain over right achilles   Hamstrings 5/5 B with mild discomfort over left hamstring     MET:  Left out flare    NAL:  Restricted SI on right    Ortho: SLR (tightness only), sloan and armandoer (-)    Assessment:  NAL with associated myofascitis and weakness     Plan:   Patient tolerated treatment well today  Treatment Time: 45 minutes  96881 manipulation 1-2 segments: MET  62320 manipulation: ankle LAD  03300 Manual therapy: (ART, Graston, Strain Counter Strain, Fascial Manipulation, Cupping) performed over area of bilateral hamstrings, R calf, R lateral quad, L TFL, B hamstring  99333 therapeutic exercise (20 minutes):   1. Calf stretching for gastroc and soleus  2. Self  plantar stretching  3. Single leg balance on [place] cushion  4. TFL stretching  5. Lateral hamstring stretching  6. Standing TFL stretching  7. side lying stretching over stability ball, piriformis stretching, marching bridges with ball squeeze   8.  straight leg bridges on stability ball, active isolated hamstring stretches, seated piriformis stretch  9.  only worked lower extremity stretching over quads, hamstrings, TFL, psoas.   10.  hip out flare exercises: left hip abduction, standing running pose with abduction, bridge with abduction, self active isolated stretching. We did review single leg step back and added some insight to make sure she feels it in her glute. Also review current PT exercises   11. she is seeing PT. Today we focused on hip stretching (TFL, hip ER). Condensed exercises down as she notes she is spending 1 hour a day doing them. We focused on stretching and hip extension exercises for glute strength. Added eccentric calfs   12.  single leg balance with opposite arm row, walking lunge with heel raise for balance, single leg paw drill, single leg bridge, figure 4 stretch  Today: single leg bride, clam shell, side lying abduction, lateral band walks, cat / cow stretching  Strapping:  Hip IR L  Next visit: PRN as does well with active care program

## 2018-07-23 ENCOUNTER — THERAPY VISIT (OUTPATIENT)
Dept: CHIROPRACTIC MEDICINE | Facility: CLINIC | Age: 53
End: 2018-07-23
Payer: COMMERCIAL

## 2018-07-23 DIAGNOSIS — M25.551 HIP PAIN, RIGHT: ICD-10-CM

## 2018-07-23 DIAGNOSIS — M99.05 SOMATIC DYSFUNCTION OF PELVIS REGION: Primary | ICD-10-CM

## 2018-07-23 DIAGNOSIS — M79.10 MYALGIA: ICD-10-CM

## 2018-07-23 PROCEDURE — 98940 CHIROPRACT MANJ 1-2 REGIONS: CPT | Mod: AT | Performed by: CHIROPRACTOR

## 2018-07-23 PROCEDURE — 97110 THERAPEUTIC EXERCISES: CPT | Performed by: CHIROPRACTOR

## 2018-07-25 NOTE — PROGRESS NOTES
Subjective:  CC: B posterior hips and lower back discomfort  Visit: 28  Goal: Speed work without minimal discomfort, train for [facility] [employer]   Comes in today doing better. Since her 24 hour race has done a few shorter races. Has been battling some right achilles pain and doing shock wave with Dr. Verde. After running right lateral leg is sore with sitting or standing pain 4/10. Pain with stairs. Denies any radiating pain. Working on active care program. Pleased with progress.     Objective:  Inspection:  mild thickening over right achilles   No Scars  Normal gait    Palpation:  Palpable soreness over B hamstrings, R VL, general lower back  Myofascitis 2/4 noted over B hamstrings, R VL, LPS    ROM:  Hip IR limited on left 5 degrees compared to right  Ankle DF limited on right with no pain  Back extension 30/30 with mild lower back pain  Hip adduction full and pain free  SLR symmetric  tightness noted over R hamstring   Knee flexion full and pain free    MMT:  Left glute med 4/5 with no pain  Right glute med 4/5 with no pain  TFL 4/5 on L with lateral hip discomfort  Heel raise 5/5 with pain over right achilles   Hamstrings 5/5 B with mild discomfort over left hamstring     MET:  Left out flare    NAL:  Restricted SI on right    Ortho: SLR (tightness only), sloan and armandoer (-)    Assessment:  NAL with associated myofascitis and weakness     Plan:   Patient tolerated treatment well today  Treatment Time: 45 minutes  27636 manipulation 1-2 segments: MET  57663 manipulation: ankle LAD  17490 Manual therapy: (ART, Graston, Strain Counter Strain, Fascial Manipulation, Cupping) performed over area of bilateral hamstrings, R calf, R lateral quad, L TFL, B hamstring  42957 therapeutic exercise (20 minutes):   1. Calf stretching for gastroc and soleus  2. Self plantar stretching  3. Single leg balance on [place] cushion  4. TFL stretching  5. Lateral hamstring stretching  6. Standing TFL stretching  7. side lying stretching  over stability ball, piriformis stretching, marching bridges with ball squeeze   8.  straight leg bridges on stability ball, active isolated hamstring stretches, seated piriformis stretch  9.  only worked lower extremity stretching over quads, hamstrings, TFL, psoas.   10.  hip out flare exercises: left hip abduction, standing running pose with abduction, bridge with abduction, self active isolated stretching. We did review single leg step back and added some insight to make sure she feels it in her glute. Also review current PT exercises   11. she is seeing PT. Today we focused on hip stretching (TFL, hip ER). Condensed exercises down as she notes she is spending 1 hour a day doing them. We focused on stretching and hip extension exercises for glute strength. Added eccentric calfs   12.  single leg balance with opposite arm row, walking lunge with heel raise for balance, single leg paw drill, single leg bridge, figure 4 stretch  Today: single leg bride, clam shell, side lying abduction, lateral band walks, cat / cow stretching, running RDL  Strapping:  Hip IR   Next visit: PRN as does well with active care program

## 2018-08-27 ENCOUNTER — THERAPY VISIT (OUTPATIENT)
Dept: CHIROPRACTIC MEDICINE | Facility: CLINIC | Age: 53
End: 2018-08-27
Payer: COMMERCIAL

## 2018-08-27 DIAGNOSIS — M99.05 SOMATIC DYSFUNCTION OF PELVIS REGION: Primary | ICD-10-CM

## 2018-08-27 DIAGNOSIS — M79.10 MYALGIA: ICD-10-CM

## 2018-08-27 DIAGNOSIS — M25.551 HIP PAIN, RIGHT: ICD-10-CM

## 2018-08-27 PROCEDURE — 98940 CHIROPRACT MANJ 1-2 REGIONS: CPT | Mod: AT | Performed by: CHIROPRACTOR

## 2018-08-27 PROCEDURE — 97110 THERAPEUTIC EXERCISES: CPT | Performed by: CHIROPRACTOR

## 2018-08-27 NOTE — PROGRESS NOTES
Subjective:  CC: R lateral leg  Visit: 29  Goal: Speed work without minimal discomfort, train for [employer]   Comes in today doing OK. Since last visit has tried to manage symptoms with active care program and massage. Had been doing well, but 2 weeks ago had to take some time off running because of lateral hip and hamstring pain. Denies any new JONH or symptoms. NO pain below knee. Massage does help. She is back running. Notes some days she feels great with no issues. In general pain is 3/10. Worse with sitting for long periods.     Objective:  Inspection:  mild thickening over right achilles   No Scars  Normal gait    Palpation:  Palpable soreness over B hamstrings, R VL, general lower back  Myofascitis 2/4 noted over B hamstrings, R VL, LPS, R BF    ROM:  Hip IR limited on left 5 degrees compared to right  Ankle DF limited on right with no pain  Back extension 30/30 with mild lower back pain  Hip adduction full and pain free  SLR symmetric  tightness noted over R hamstring   Knee flexion full and pain free    MMT:  Left glute med 4/5 with no pain  Right glute med 4/5 with no pain  TFL 4/5 on L with lateral hip discomfort  Heel raise 5/5 with pain over right achilles   Hamstrings 5/5 B with mild discomfort over R hamstring     MET:  Left out flare    NAL:  Restricted SI on right    Ortho: SLR (tightness only), sloan and fader (-)    Assessment:  NAL with associated myofascitis and weakness     Plan:   Patient tolerated treatment well today  Treatment Time: 45 minutes  24227 manipulation 1-2 segments: MET  55312 manipulation: ankle LAD  91889 Manual therapy: (ART, Graston, Strain Counter Strain, Fascial Manipulation, Cupping) performed over area of bilateral hamstrings, R calf, R lateral quad, L TFL, B hamstring  19787 therapeutic exercise (20 minutes):   1. Calf stretching for gastroc and soleus  2. Self plantar stretching  3. Single leg balance on [place] cushion  4. TFL stretching  5. Lateral hamstring  stretching  6. Standing TFL stretching  7. side lying stretching over stability ball, piriformis stretching, marching bridges with ball squeeze   8.  straight leg bridges on stability ball, active isolated hamstring stretches, seated piriformis stretch  9.  only worked lower extremity stretching over quads, hamstrings, TFL, psoas.   10.  hip out flare exercises: left hip abduction, standing running pose with abduction, bridge with abduction, self active isolated stretching. We did review single leg step back and added some insight to make sure she feels it in her glute. Also review current PT exercises   11. she is seeing PT. Today we focused on hip stretching (TFL, hip ER). Condensed exercises down as she notes she is spending 1 hour a day doing them. We focused on stretching and hip extension exercises for glute strength. Added eccentric calfs   12.  single leg balance with opposite arm row, walking lunge with heel raise for balance, single leg paw drill, single leg bridge, figure 4 stretch  single leg bride, clam shell, side lying abduction, lateral band walks, cat / cow stretching, running RDL  Today: supine nerve floss   Strapping:  Hip IR   Next visit: PRN as does well with active care program

## 2018-09-10 ENCOUNTER — THERAPY VISIT (OUTPATIENT)
Dept: CHIROPRACTIC MEDICINE | Facility: CLINIC | Age: 53
End: 2018-09-10
Payer: COMMERCIAL

## 2018-09-10 DIAGNOSIS — M99.05 SOMATIC DYSFUNCTION OF PELVIS REGION: ICD-10-CM

## 2018-09-10 DIAGNOSIS — M79.10 MYALGIA: ICD-10-CM

## 2018-09-10 DIAGNOSIS — M25.551 HIP PAIN, RIGHT: ICD-10-CM

## 2018-09-10 PROCEDURE — 97110 THERAPEUTIC EXERCISES: CPT | Performed by: CHIROPRACTOR

## 2018-09-10 PROCEDURE — 98940 CHIROPRACT MANJ 1-2 REGIONS: CPT | Mod: AT | Performed by: CHIROPRACTOR

## 2018-09-11 NOTE — PROGRESS NOTES
Subjective:  CC: R lateral leg  Visit: 30  Goal: Speed work without minimal discomfort, train for [place]   Comes in today doing well since last time. Tolerated last session well and very pleased with progress. Raced 1/2 marathon yesterday and it went very well. Has some right lateral leg tightness worse with standing. Active care is going well. Denies any new issues. Notes general soreness from the race. Training for full marathon in less than week.     Objective:  Inspection:  mild thickening over right achilles   No Scars  Normal gait    Palpation:  Palpable soreness over B hamstrings, R VL, general lower back  Myofascitis 2/4 noted over B hamstrings, R VL, LPS, R BF    ROM:  Hip IR limited on left 5 degrees compared to right  Ankle DF limited on right with no pain  Back extension 30/30 with mild lower back pain  Hip adduction full and pain free  SLR symmetric  tightness noted over R hamstring   Knee flexion full and pain free    MMT:  Left glute med 4/5 with no pain  Right glute med 4/5 with no pain  TFL 4/5 on L with lateral hip discomfort  Heel raise 5/5 with pain over right achilles   Hamstrings 5/5 B with mild discomfort over R hamstring     MET:  Left out flare    NAL:  Restricted SI on right    Ortho: SLR (tightness only), sloan and ashley (-)    Assessment:  NAL with associated myofascitis and weakness     Plan:   Patient tolerated treatment well today  Treatment Time: 45 minutes  71975 manipulation 1-2 segments: MET  25641 manipulation: ankle LAD  02772 Manual therapy: (ART, Graston, Strain Counter Strain, Fascial Manipulation, Cupping) performed over area of bilateral hamstrings, R calf, R lateral quad, L TFL, B hamstring  09657 therapeutic exercise (20 minutes):   1. Calf stretching for gastroc and soleus  2. Self plantar stretching  3. Single leg balance on [place] cushion  4. TFL stretching  5. Lateral hamstring stretching  6. Standing TFL stretching  7. side lying stretching over stability  ball, piriformis stretching, marching bridges with ball squeeze   8.  straight leg bridges on stability ball, active isolated hamstring stretches, seated piriformis stretch  9.  only worked lower extremity stretching over quads, hamstrings, TFL, psoas.   10.  hip out flare exercises: left hip abduction, standing running pose with abduction, bridge with abduction, self active isolated stretching. We did review single leg step back and added some insight to make sure she feels it in her glute. Also review current PT exercises   11. she is seeing PT. Today we focused on hip stretching (TFL, hip ER). Condensed exercises down as she notes she is spending 1 hour a day doing them. We focused on stretching and hip extension exercises for glute strength. Added eccentric calfs   12.  single leg balance with opposite arm row, walking lunge with heel raise for balance, single leg paw drill, single leg bridge, figure 4 stretch  single leg bride, clam shell, side lying abduction, lateral band walks, cat / cow stretching, running RDL  Today: supine nerve floss   Strapping:  Hip IR   Next visit: PRN as does well with active care program

## 2018-09-24 ENCOUNTER — THERAPY VISIT (OUTPATIENT)
Dept: CHIROPRACTIC MEDICINE | Facility: CLINIC | Age: 53
End: 2018-09-24
Payer: COMMERCIAL

## 2018-09-24 DIAGNOSIS — M79.10 MYALGIA: ICD-10-CM

## 2018-09-24 DIAGNOSIS — M25.551 HIP PAIN, RIGHT: ICD-10-CM

## 2018-09-24 DIAGNOSIS — M99.05 SOMATIC DYSFUNCTION OF PELVIS REGION: ICD-10-CM

## 2018-09-24 PROCEDURE — 98940 CHIROPRACT MANJ 1-2 REGIONS: CPT | Mod: AT | Performed by: CHIROPRACTOR

## 2018-09-24 PROCEDURE — 97110 THERAPEUTIC EXERCISES: CPT | Performed by: CHIROPRACTOR

## 2018-09-24 NOTE — PROGRESS NOTES
Subjective:  CC: R lateral leg  Visit: 31  Goal: Speed work without minimal discomfort, train for "[employer]   Comes in today doing OK. Running full marathon in 2 weeks at  [place]. Notes that has massage scheduled for next week. Notes that legs are feeling good. Notes that pleased with progress. Denies any new issues. Notes not collapsing as much and feels stronger. Notes feels lower back the most but feels better with activity. Denies any radiating pain. Pleased with progress.     Objective:  Inspection:  mild thickening over right achilles   No Scars  Normal gait    Palpation:  Palpable soreness over B hamstrings, R VL, general lower back  Myofascitis 2/4 noted over B hamstrings, R VL, LPS, R BF    ROM:  Hip IR limited on left 5 degrees compared to right  Ankle DF limited on right with no pain  Back extension 30/30 with mild lower back pain  Hip adduction full and pain free  SLR symmetric  tightness noted over R hamstring   Knee flexion full and pain free    MMT:  Left glute med 4/5 with no pain  Right glute med 4/5 with no pain  TFL 4/5 on L with lateral hip discomfort  Heel raise 5/5 with pain over right achilles   Hamstrings 5/5 B with mild discomfort over R hamstring     MET:  Left out flare    NAL:  Restricted SI on right    Ortho: SLR (tightness only), sloan and armandoer (-)    Assessment:  NAL with associated myofascitis and weakness     Plan:   Patient tolerated treatment well today  Treatment Time: 45 minutes  28719 manipulation 1-2 segments: MET  00242 manipulation: ankle LAD  61876 Manual therapy: (ART, Graston, Strain Counter Strain, Fascial Manipulation, Cupping) performed over area of bilateral hamstrings, R calf, R lateral quad, L TFL, B hamstring  62660 therapeutic exercise (20 minutes):   1. Calf stretching for gastroc and soleus  2. Self plantar stretching  3. Single leg balance on [place] cushion  4. TFL stretching  5. Lateral hamstring stretching  6. Standing TFL stretching  7. side lying  stretching over stability ball, piriformis stretching, marching bridges with ball squeeze   8.  straight leg bridges on stability ball, active isolated hamstring stretches, seated piriformis stretch  9.  only worked lower extremity stretching over quads, hamstrings, TFL, psoas.   10.  hip out flare exercises: left hip abduction, standing running pose with abduction, bridge with abduction, self active isolated stretching. We did review single leg step back and added some insight to make sure she feels it in her glute. Also review current PT exercises   11. she is seeing PT. Today we focused on hip stretching (TFL, hip ER). Condensed exercises down as she notes she is spending 1 hour a day doing them. We focused on stretching and hip extension exercises for glute strength. Added eccentric calfs   12.  single leg balance with opposite arm row, walking lunge with heel raise for balance, single leg paw drill, single leg bridge, figure 4 stretch  single leg bride, clam shell, side lying abduction, lateral band walks, cat / cow stretching, running RDL  Today: supine nerve floss   Strapping:  Hip IR   Next visit: PRN as does well with active care program

## 2018-10-22 ENCOUNTER — THERAPY VISIT (OUTPATIENT)
Dept: CHIROPRACTIC MEDICINE | Facility: CLINIC | Age: 53
End: 2018-10-22
Payer: COMMERCIAL

## 2018-10-22 DIAGNOSIS — M99.05 SOMATIC DYSFUNCTION OF PELVIS REGION: ICD-10-CM

## 2018-10-22 DIAGNOSIS — M79.10 MYALGIA: ICD-10-CM

## 2018-10-22 DIAGNOSIS — M25.551 HIP PAIN, RIGHT: ICD-10-CM

## 2018-10-22 PROCEDURE — 97110 THERAPEUTIC EXERCISES: CPT | Performed by: CHIROPRACTOR

## 2018-10-22 PROCEDURE — 98940 CHIROPRACT MANJ 1-2 REGIONS: CPT | Mod: AT | Performed by: CHIROPRACTOR

## 2018-10-24 NOTE — PROGRESS NOTES
Subjective:  CC: R lateral leg  Visit: 32  Goal: Speed work without minimal discomfort  Comes in today doing well. Ran marathon and it went very well. Took about 2 weeks easy to recover and is back training. Might get into 100 miler in next couple weeks. Notes some right lateral leg tightness, left lateral hip, and lower back discomfort. Most pain with sitting over 30 minutes. Active care is helping her. She denies any new issues or changes in health history.     Objective:  Inspection:  mild thickening over right achilles   No Scars  Normal gait    Palpation:  Palpable soreness over B hamstrings, R VL, general lower back  Myofascitis 2/4 noted over B hamstrings, R VL, LPS, R BF    ROM:  Hip IR limited on left 5 degrees compared to right  Ankle DF limited on right with no pain  Back extension 30/30 with mild lower back pain  Hip adduction full and pain free  SLR symmetric  tightness noted over R hamstring   Knee flexion full and pain free    MMT:  Left glute med 4/5 with no pain  Right glute med 4/5 with no pain  TFL 4/5 on L with lateral hip discomfort  Heel raise 5/5 with pain over right achilles   Hamstrings 5/5 B with mild discomfort over R hamstring     MET:  Left out flare    NAL:  Restricted SI on right    Ortho: SLR (tightness only), sloan and ashley (-)    Assessment:  NAL with associated myofascitis and weakness     Plan:   Patient tolerated treatment well today  Treatment Time: 45 minutes  28991 manipulation 1-2 segments: MET  89965 manipulation: ankle LAD  72966 Manual therapy: (ART, Graston, Strain Counter Strain, Fascial Manipulation, Cupping) performed over area of bilateral hamstrings, R calf, R lateral quad, L TFL, B hamstring  78476 therapeutic exercise (20 minutes):   1. Calf stretching for gastroc and soleus  2. Self plantar stretching  3. Single leg balance on [place] cushion  4. TFL stretching  5. Lateral hamstring stretching  6. Standing TFL stretching  7. side lying stretching over stability  ball, piriformis stretching, marching bridges with ball squeeze   8.  straight leg bridges on stability ball, active isolated hamstring stretches, seated piriformis stretch  9.  only worked lower extremity stretching over quads, hamstrings, TFL, psoas.   10.  hip out flare exercises: left hip abduction, standing running pose with abduction, bridge with abduction, self active isolated stretching. We did review single leg step back and added some insight to make sure she feels it in her glute. Also review current PT exercises   11. she is seeing PT. Today we focused on hip stretching (TFL, hip ER). Condensed exercises down as she notes she is spending 1 hour a day doing them. We focused on stretching and hip extension exercises for glute strength. Added eccentric calfs   12.  single leg balance with opposite arm row, walking lunge with heel raise for balance, single leg paw drill, single leg bridge, figure 4 stretch  single leg bride, clam shell, side lying abduction, lateral band walks, cat / cow stretching, running RDL  Today: supine nerve floss. Lumbar side rotation   Strapping:  Hip IR   Next visit: PRN as does well with active care program

## 2018-12-06 ENCOUNTER — THERAPY VISIT (OUTPATIENT)
Dept: CHIROPRACTIC MEDICINE | Facility: CLINIC | Age: 53
End: 2018-12-06
Payer: COMMERCIAL

## 2018-12-06 DIAGNOSIS — M79.10 MYALGIA: ICD-10-CM

## 2018-12-06 DIAGNOSIS — M99.05 SOMATIC DYSFUNCTION OF PELVIS REGION: ICD-10-CM

## 2018-12-06 DIAGNOSIS — M25.551 HIP PAIN, RIGHT: ICD-10-CM

## 2018-12-06 PROCEDURE — 97110 THERAPEUTIC EXERCISES: CPT | Performed by: CHIROPRACTOR

## 2018-12-06 PROCEDURE — 98940 CHIROPRACT MANJ 1-2 REGIONS: CPT | Mod: AT | Performed by: CHIROPRACTOR

## 2018-12-07 NOTE — PROGRESS NOTES
Subjective:  CC: R lateral leg  Visit: 33  Goal: Speed work without minimal discomfort  Comes in today doing well. Doing 24 hour race this weekend. Training has been going well. Had massage last week. Notes right lateral thigh tightness with pain 3/10 and worse with stairs and standing. Notes similar symptoms she has had in past with taper for race. Denies any new changes in health history. Active care is going well. Pleased with progress. Notes some tightness over R achilles but states only because of snow boots. In shoes feels good.     Objective:  Inspection:  mild thickening over right achilles   No Scars  Normal gait    Palpation:  Palpable soreness over B hamstrings, R VL, general lower back  Myofascitis 2/4 noted over B hamstrings, R VL, LPS, R BF    ROM:  Hip IR limited on left 5 degrees compared to right  Ankle DF limited on right with no pain  Back extension 30/30 with mild lower back pain  Hip adduction full and pain free  SLR symmetric  tightness noted over R hamstring   Knee flexion full and pain free    MMT:  Left glute med 4/5 with no pain  Right glute med 4/5 with no pain  TFL 4/5 on L with lateral hip discomfort  Heel raise 5/5 with pain over right achilles   Hamstrings 5/5 B with mild discomfort over R hamstring     MET:  Left out flare    NAL:  Restricted SI on right    Ortho: SLR (tightness only), sloan and armandoer (-)    Assessment:  NAL with associated myofascitis and weakness     Plan:   Patient tolerated treatment well today  Treatment Time: 45 minutes  97309 manipulation 1-2 segments: MET  64071 manipulation: ankle LAD  58528 Manual therapy: (ART, Graston, Strain Counter Strain, Fascial Manipulation, Cupping) performed over area of bilateral hamstrings, R calf, R lateral quad, L TFL, B hamstring  05281 therapeutic exercise (20 minutes):   1. Calf stretching for gastroc and soleus  2. Self plantar stretching  3. Single leg balance on [place] cushion  4. TFL stretching  5. Lateral hamstring  stretching  6. Standing TFL stretching  7. side lying stretching over stability ball, piriformis stretching, marching bridges with ball squeeze   8.  straight leg bridges on stability ball, active isolated hamstring stretches, seated piriformis stretch  9.  only worked lower extremity stretching over quads, hamstrings, TFL, psoas.   10.  hip out flare exercises: left hip abduction, standing running pose with abduction, bridge with abduction, self active isolated stretching. We did review single leg step back and added some insight to make sure she feels it in her glute. Also review current PT exercises   11. she is seeing PT. Today we focused on hip stretching (TFL, hip ER). Condensed exercises down as she notes she is spending 1 hour a day doing them. We focused on stretching and hip extension exercises for glute strength. Added eccentric calfs   12.  single leg balance with opposite arm row, walking lunge with heel raise for balance, single leg paw drill, single leg bridge, figure 4 stretch  single leg bride, clam shell, side lying abduction, lateral band walks, cat / cow stretching, running RDL  Today: supine nerve floss. Lumbar side rotation, leg swings   Strapping:  Hip IR   Next visit: PRN as does well with active care program

## 2019-01-07 ENCOUNTER — THERAPY VISIT (OUTPATIENT)
Dept: CHIROPRACTIC MEDICINE | Facility: CLINIC | Age: 54
End: 2019-01-07
Payer: COMMERCIAL

## 2019-01-07 DIAGNOSIS — M99.05 SOMATIC DYSFUNCTION OF PELVIS REGION: ICD-10-CM

## 2019-01-07 DIAGNOSIS — M25.551 HIP PAIN, RIGHT: ICD-10-CM

## 2019-01-07 DIAGNOSIS — M79.10 MYALGIA: ICD-10-CM

## 2019-01-07 PROCEDURE — 98940 CHIROPRACT MANJ 1-2 REGIONS: CPT | Mod: AT | Performed by: CHIROPRACTOR

## 2019-01-07 PROCEDURE — 97110 THERAPEUTIC EXERCISES: CPT | Performed by: CHIROPRACTOR

## 2019-01-13 NOTE — PROGRESS NOTES
Subjective:  CC: R lateral leg  Visit: 34  Goal: Speed work without minimal discomfort  Comes in today doing well. Id 24 hour race and did 100 miles. Had some issues at the end of the race which caused her to drop at that time. She notes she took couple weeks off of running and is again back into running. Comes in today as has some similar complaints in right hip and leg. Notes did get massage last week which helped. Denies any new issues or changes in health history. Notes was very sore after the race. She does note that she is able to go much longer without treatment and has less pain with ADL's. Active care is going well.    Objective:  Inspection:  mild thickening over right achilles   No Scars  Normal gait    Palpation:  Palpable soreness over B hamstrings, R VL, general lower back  Myofascitis 2/4 noted over B hamstrings, R VL, LPS, R BF    ROM:  Hip IR limited on left 5 degrees compared to right  Ankle DF limited on right with no pain  Back extension 30/30 with mild lower back pain  Hip adduction full and pain free  SLR symmetric  tightness noted over R hamstring   Knee flexion full and pain free    MMT:  Left glute med 4/5 with no pain  Right glute med 4/5 with no pain  TFL 4/5 on L with lateral hip discomfort  Heel raise 5/5 with pain over right achilles   Hamstrings 5/5 B with mild discomfort over R hamstring     MET:  Left out flare    NAL:  Restricted SI on right    Ortho: SLR (tightness only), sloan and armandoer (-)    Assessment:  NAL with associated myofascitis and weakness     Plan:   Patient tolerated treatment well today  Treatment Time: 45 minutes  15626 manipulation 1-2 segments: MET  97929 manipulation: ankle LAD  69310 Manual therapy: (ART, Graston, Strain Counter Strain, Fascial Manipulation, Cupping) performed over area of bilateral hamstrings, R calf, R lateral quad, L TFL, B hamstring  50404 therapeutic exercise (20 minutes):   1. Calf stretching for gastroc and soleus  2. Self plantar  stretching  3. Single leg balance on [place] cushion  4. TFL stretching  5. Lateral hamstring stretching  6. Standing TFL stretching  7. side lying stretching over stability ball, piriformis stretching, marching bridges with ball squeeze   8.  straight leg bridges on stability ball, active isolated hamstring stretches, seated piriformis stretch  9.  only worked lower extremity stretching over quads, hamstrings, TFL, psoas.   10.  hip out flare exercises: left hip abduction, standing running pose with abduction, bridge with abduction, self active isolated stretching. We did review single leg step back and added some insight to make sure she feels it in her glute. Also review current PT exercises   11. she is seeing PT. Today we focused on hip stretching (TFL, hip ER). Condensed exercises down as she notes she is spending 1 hour a day doing them. We focused on stretching and hip extension exercises for glute strength. Added eccentric calfs   12.  single leg balance with opposite arm row, walking lunge with heel raise for balance, single leg paw drill, single leg bridge, figure 4 stretch  single leg bride, clam shell, side lying abduction, lateral band walks, cat / cow stretching, running RDL  Today: supine nerve floss. Lumbar side rotation, leg swings   Strapping:  Hip IR   Next visit: PRN as does well with active care program

## 2019-01-21 ENCOUNTER — THERAPY VISIT (OUTPATIENT)
Dept: CHIROPRACTIC MEDICINE | Facility: CLINIC | Age: 54
End: 2019-01-21
Payer: COMMERCIAL

## 2019-01-21 DIAGNOSIS — M79.10 MYALGIA: ICD-10-CM

## 2019-01-21 DIAGNOSIS — M25.561 ACUTE PAIN OF RIGHT KNEE: ICD-10-CM

## 2019-01-21 DIAGNOSIS — M99.05 SOMATIC DYSFUNCTION OF PELVIS REGION: Primary | ICD-10-CM

## 2019-01-21 DIAGNOSIS — M25.551 HIP PAIN, RIGHT: ICD-10-CM

## 2019-01-21 PROCEDURE — 98940 CHIROPRACT MANJ 1-2 REGIONS: CPT | Mod: AT | Performed by: CHIROPRACTOR

## 2019-01-21 PROCEDURE — 97110 THERAPEUTIC EXERCISES: CPT | Performed by: CHIROPRACTOR

## 2019-01-22 NOTE — PROGRESS NOTES
Subjective:  CC: R lateral leg  Visit: 35  Goal: Speed work without minimal discomfort  Comes in today limping. Notes had do walk on run today. Notes left distal lateral knee pain for 3 days. Pain with bending. Denies any swelling. Denies any specific JONH. Notes pain is 4/10.     Objective:  Inspection:  mild thickening over right achilles   No Scars  Normal gait    Palpation:  Palpable soreness over R IT, R BF  Myofascitis 2/4 noted over R IT, R BF    ROM:  Hip IR limited on left 5 degrees compared to right  Ankle DF limited on right with no pain  Back extension 30/30 with mild lower back pain  Hip adduction full and pain free  SLR symmetric  tightness noted over R hamstring   Knee flexion full with lateral knee pain     MMT:  Left glute med 4/5 with no pain  Right glute med 4/5 with no pain  TFL 4/5 B with lateral hip discomfort  Heel raise 5/5 with pain over right achilles   Hamstrings 5/5 B with mild discomfort over R hamstring     MET:  Left out flare    NAL:  Restricted SI on right    Ortho: SLR (tightness only), sloan and ashley (-), jocelyn (-)    Assessment:  NAL with associated myofascitis and weakness   Distal hamstring tendinitis     Plan:   Patient tolerated treatment well today  Treatment Time: 45 minutes  67058 manipulation 1-2 segments: MET  04445 manipulation: ankle LAD  52631 Manual therapy: (ART, Graston, Strain Counter Strain, Fascial Manipulation, Cupping) performed over area of bilateral hamstrings, R calf, R lateral quad, B TFL, R hamstring  42781 therapeutic exercise (20 minutes):   1. Calf stretching for gastroc and soleus  2. Self plantar stretching  3. Single leg balance on [place] cushion  4. TFL stretching  5. Lateral hamstring stretching  6. Standing TFL stretching  7. side lying stretching over stability ball, piriformis stretching, marching bridges with ball squeeze   8.  straight leg bridges on stability ball, active isolated hamstring stretches, seated piriformis stretch  9.  only  worked lower extremity stretching over quads, hamstrings, TFL, psoas.   10.  hip out flare exercises: left hip abduction, standing running pose with abduction, bridge with abduction, self active isolated stretching. We did review single leg step back and added some insight to make sure she feels it in her glute. Also review current PT exercises   11. she is seeing PT. Today we focused on hip stretching (TFL, hip ER). Condensed exercises down as she notes she is spending 1 hour a day doing them. We focused on stretching and hip extension exercises for glute strength. Added eccentric calfs   12.  single leg balance with opposite arm row, walking lunge with heel raise for balance, single leg paw drill, single leg bridge, figure 4 stretch  single leg bride, clam shell, side lying abduction, lateral band walks, cat / cow stretching, running RDL  Today: SLR isometrics, single leg step down, talked about cross training for 3-4 days and letting it calm down  US: 5 minutes over R distal lateral hamstring   Strapping:  Hip IR   Next visit: PRN as does well with active care program

## 2019-01-30 ENCOUNTER — THERAPY VISIT (OUTPATIENT)
Dept: CHIROPRACTIC MEDICINE | Facility: CLINIC | Age: 54
End: 2019-01-30
Payer: COMMERCIAL

## 2019-01-30 DIAGNOSIS — M79.10 MYALGIA: ICD-10-CM

## 2019-01-30 DIAGNOSIS — M99.05 SOMATIC DYSFUNCTION OF PELVIS REGION: Primary | ICD-10-CM

## 2019-01-30 DIAGNOSIS — M76.899 HAMSTRING TENDINITIS: ICD-10-CM

## 2019-01-30 DIAGNOSIS — M25.551 HIP PAIN, RIGHT: ICD-10-CM

## 2019-01-30 PROCEDURE — 98940 CHIROPRACT MANJ 1-2 REGIONS: CPT | Mod: AT | Performed by: CHIROPRACTOR

## 2019-01-30 PROCEDURE — 97110 THERAPEUTIC EXERCISES: CPT | Performed by: CHIROPRACTOR

## 2019-01-31 NOTE — PROGRESS NOTES
Subjective:  CC: R lateral leg  Visit: 36  Goal: Speed work without minimal discomfort  Comes in today much better. Was able to walk normally and feel good. Has started running again and working on active care program. Did get a massage as well. Notes tightness now, but much less pain. Notes very stressed in life as dealing with passing of a friend earlier today. Denies any new changes in health history.     Objective:  Inspection:  mild thickening over right achilles   No Scars  Normal gait    Palpation:  Palpable soreness over R IT, R BF  Myofascitis 2/4 noted over R IT, R BF    ROM:  Hip IR limited on left 5 degrees compared to right  Ankle DF limited on right with no pain  Back extension 30/30 with mild lower back pain  Hip adduction full and pain free  SLR symmetric  tightness noted over R hamstring   Knee flexion full with lateral knee pain     MMT:  Left glute med 4/5 with no pain  Right glute med 4/5 with no pain  TFL 4/5 B with lateral hip discomfort  Heel raise 5/5 with pain over right achilles   Hamstrings 5/5 B with mild discomfort over R hamstring     MET:  Left out flare    NAL:  Restricted SI on right    Ortho: SLR (tightness only), sloan and armandoer (-), jocelyn (-)    Assessment:  NAL with associated myofascitis and weakness   Distal hamstring tendinitis     Plan:   Patient tolerated treatment well today  Treatment Time: 45 minutes  57057 manipulation 1-2 segments: MET  61509 manipulation: ankle LAD  16750 Manual therapy: (ART, Graston, Strain Counter Strain, Fascial Manipulation, Cupping) performed over area of bilateral hamstrings, R calf, R lateral quad, B TFL, R hamstring  58641 therapeutic exercise (20 minutes):   1. Calf stretching for gastroc and soleus  2. Self plantar stretching  3. Single leg balance on [place] cushion  4. TFL stretching  5. Lateral hamstring stretching  6. Standing TFL stretching  7. side lying stretching over stability ball, piriformis stretching, marching bridges with ball  squeeze   8.  straight leg bridges on stability ball, active isolated hamstring stretches, seated piriformis stretch  9.  only worked lower extremity stretching over quads, hamstrings, TFL, psoas.   10.  hip out flare exercises: left hip abduction, standing running pose with abduction, bridge with abduction, self active isolated stretching. We did review single leg step back and added some insight to make sure she feels it in her glute. Also review current PT exercises   11. she is seeing PT. Today we focused on hip stretching (TFL, hip ER). Condensed exercises down as she notes she is spending 1 hour a day doing them. We focused on stretching and hip extension exercises for glute strength. Added eccentric calfs   12.  single leg balance with opposite arm row, walking lunge with heel raise for balance, single leg paw drill, single leg bridge, figure 4 stretch  single leg bride, clam shell, side lying abduction, lateral band walks, cat / cow stretching, running RDL  Today: L protocol for hamstring  US: 5 minutes over R distal lateral hamstring   Strapping:  Hip IR   Next visit: PRN as does well with active care program

## 2019-02-04 ENCOUNTER — THERAPY VISIT (OUTPATIENT)
Dept: CHIROPRACTIC MEDICINE | Facility: CLINIC | Age: 54
End: 2019-02-04
Payer: COMMERCIAL

## 2019-02-04 DIAGNOSIS — M99.05 SOMATIC DYSFUNCTION OF PELVIS REGION: ICD-10-CM

## 2019-02-04 DIAGNOSIS — M79.10 MYALGIA: ICD-10-CM

## 2019-02-04 DIAGNOSIS — M25.551 HIP PAIN, RIGHT: ICD-10-CM

## 2019-02-11 ENCOUNTER — THERAPY VISIT (OUTPATIENT)
Dept: CHIROPRACTIC MEDICINE | Facility: CLINIC | Age: 54
End: 2019-02-11
Payer: COMMERCIAL

## 2019-02-11 DIAGNOSIS — M76.899 HAMSTRING TENDONITIS: ICD-10-CM

## 2019-02-11 DIAGNOSIS — M99.05 SOMATIC DYSFUNCTION OF PELVIS REGION: Primary | ICD-10-CM

## 2019-02-11 DIAGNOSIS — M25.551 HIP PAIN, RIGHT: ICD-10-CM

## 2019-02-11 DIAGNOSIS — M79.10 MYALGIA: ICD-10-CM

## 2019-02-11 PROCEDURE — 98940 CHIROPRACT MANJ 1-2 REGIONS: CPT | Mod: AT | Performed by: CHIROPRACTOR

## 2019-02-11 PROCEDURE — 97110 THERAPEUTIC EXERCISES: CPT | Performed by: CHIROPRACTOR

## 2019-02-11 NOTE — PROGRESS NOTES
Subjective:  CC: R lateral leg  Visit: 37  Goal: Speed work without minimal discomfort  Comes in today able to run but still sore and feels like it gives out. Notes that it is better than it was 2 sessions ago. Denies any new issues. Notes that feels it along lateral aspect distal hamstring. Pain is not consistent. Feels like things are twisted up. Notes that feels it more when running slower. Notes getting started is sore as well. Notes that when tight feels a leg whip. Doing L protocol but doesn't notice a change.     Objective:  Inspection:  mild thickening over right achilles   No Scars  Normal gait    Palpation:  Palpable soreness over R IT, R BF  Myofascitis 2/4 noted over R IT, R BF    ROM:  Hip IR limited on left 5 degrees compared to right  Ankle DF limited on right with no pain  Back extension 30/30 with mild lower back pain  Hip adduction full and pain free  SLR symmetric  tightness noted over R hamstring   Knee flexion full with lateral knee pain     MMT:  Left glute med 4/5 with no pain  Right glute med 4/5 with no pain  TFL 4/5 B with lateral hip discomfort  Heel raise 5/5 with pain over right achilles   Hamstrings 5/5 B with mild discomfort over R hamstring     MET:  Left out flare    NAL:  Restricted SI on right    Ortho: SLR (tightness only), sloan and armandoer (-), jocelyn (-)    Assessment:  NAL with associated myofascitis and weakness   Distal hamstring tendinitis     Plan:   Patient tolerated treatment well today  Treatment Time: 45 minutes  73279 manipulation 1-2 segments: MET  72827 manipulation: ankle LAD  71712 Manual therapy: (ART, Graston, Strain Counter Strain, Fascial Manipulation, Cupping) performed over area of bilateral hamstrings, R calf, R lateral quad, B TFL, R hamstring  67643 therapeutic exercise (20 minutes):   1. Calf stretching for gastroc and soleus  2. Self plantar stretching  3. Single leg balance on [place] cushion  4. TFL stretching  5. Lateral hamstring stretching  6.  Standing TFL stretching  7. side lying stretching over stability ball, piriformis stretching, marching bridges with ball squeeze   8.  straight leg bridges on stability ball, active isolated hamstring stretches, seated piriformis stretch  9.  only worked lower extremity stretching over quads, hamstrings, TFL, psoas.   10.  hip out flare exercises: left hip abduction, standing running pose with abduction, bridge with abduction, self active isolated stretching. We did review single leg step back and added some insight to make sure she feels it in her glute. Also review current PT exercises   11. she is seeing PT. Today we focused on hip stretching (TFL, hip ER). Condensed exercises down as she notes she is spending 1 hour a day doing them. We focused on stretching and hip extension exercises for glute strength. Added eccentric calfs   12.  single leg balance with opposite arm row, walking lunge with heel raise for balance, single leg paw drill, single leg bridge, figure 4 stretch  single leg bride, clam shell, side lying abduction, lateral band walks, cat / cow stretching, running RDL  13. L protocol for hamstring  Today: standing fire hydrants red band, curls on stability ball  US: 5 minutes over R distal lateral hamstring (did not do)  Shock wave: 15/8 over lateral hamstring  Strapping:  Hip IR   Next visit: PRN as does well with active care program

## 2019-03-04 ENCOUNTER — THERAPY VISIT (OUTPATIENT)
Dept: CHIROPRACTIC MEDICINE | Facility: CLINIC | Age: 54
End: 2019-03-04
Payer: COMMERCIAL

## 2019-03-04 DIAGNOSIS — M25.551 HIP PAIN, RIGHT: ICD-10-CM

## 2019-03-04 DIAGNOSIS — M79.10 MYALGIA: ICD-10-CM

## 2019-03-04 DIAGNOSIS — M99.05 SOMATIC DYSFUNCTION OF PELVIS REGION: Primary | ICD-10-CM

## 2019-03-04 PROCEDURE — 97110 THERAPEUTIC EXERCISES: CPT | Performed by: CHIROPRACTOR

## 2019-03-04 PROCEDURE — 98940 CHIROPRACT MANJ 1-2 REGIONS: CPT | Mod: AT | Performed by: CHIROPRACTOR

## 2019-03-04 NOTE — PROGRESS NOTES
Subjective:  CC: R lateral leg  Visit: 38  Goal: Speed work without minimal discomfort  Comes in today doing better. Notes her running is going well. She is racing 8K this weekend. Notes that distal lateral hamstring is much better. Still gives out on occasion. Notes right lateral hip is tight. Active care is going well. Denies any new issues. Still dealing with a lot of stress, but running is helping that.     Objective:  Inspection:  mild thickening over right achilles   No Scars  Normal gait    Palpation:  Palpable soreness over R IT, R BF, R VL, R TFL  Myofascitis 2/4 noted over R IT, R BF, R hip ER's    ROM:  Hip IR limited on left 5 degrees compared to right  Ankle DF limited on right with no pain  Back extension 30/30 with mild lower back pain  Hip adduction full and pain free  SLR symmetric  tightness noted over R hamstring   Knee flexion full with lateral knee pain     MMT:  Left glute med 4/5 with no pain  Right glute med 4/5 with no pain  TFL 4/5 B with lateral hip discomfort  Heel raise 5/5 with pain over right achilles   Hamstrings 5/5 B with mild discomfort over R hamstring     MET:  Left out flare    NAL:  Restricted SI on right    Ortho: SLR (tightness only), sloan and armandoer (-), jocelyn (-)    Assessment:  NAL with associated myofascitis and weakness   Distal hamstring tendinitis     Plan:   Patient tolerated treatment well today  Treatment Time: 45 minutes  08126 manipulation 1-2 segments: MET  62270 manipulation: ankle LAD  10064 Manual therapy: (ART, Graston, Strain Counter Strain, Fascial Manipulation, Cupping) performed over area of bilateral hamstrings, R calf, R lateral quad, B TFL, R hamstring  66428 therapeutic exercise (20 minutes):   1. Calf stretching for gastroc and soleus  2. Self plantar stretching  3. Single leg balance on [place] cushion  4. TFL stretching  5. Lateral hamstring stretching  6. Standing TFL stretching  7. side lying stretching over stability ball, piriformis  stretching, marching bridges with ball squeeze   8.  straight leg bridges on stability ball, active isolated hamstring stretches, seated piriformis stretch  9.  only worked lower extremity stretching over quads, hamstrings, TFL, psoas.   10.  hip out flare exercises: left hip abduction, standing running pose with abduction, bridge with abduction, self active isolated stretching. We did review single leg step back and added some insight to make sure she feels it in her glute. Also review current PT exercises   11. she is seeing PT. Today we focused on hip stretching (TFL, hip ER). Condensed exercises down as she notes she is spending 1 hour a day doing them. We focused on stretching and hip extension exercises for glute strength. Added eccentric calfs   12.  single leg balance with opposite arm row, walking lunge with heel raise for balance, single leg paw drill, single leg bridge, figure 4 stretch  single leg bride, clam shell, side lying abduction, lateral band walks, cat / cow stretching, running RDL  13. L protocol for hamstring  Today: standing fire hydrants red band, curls on stability ball, L protocol  US: 5 minutes over R distal lateral hamstring (did not do)  Shock wave: 15/8 over lateral hamstring (did not do)  Strapping:  Hip IR   Next visit: PRN as does well with active care program

## 2019-03-18 ENCOUNTER — THERAPY VISIT (OUTPATIENT)
Dept: CHIROPRACTIC MEDICINE | Facility: CLINIC | Age: 54
End: 2019-03-18
Payer: COMMERCIAL

## 2019-03-18 DIAGNOSIS — M25.551 HIP PAIN, RIGHT: ICD-10-CM

## 2019-03-18 DIAGNOSIS — M99.05 SOMATIC DYSFUNCTION OF PELVIS REGION: Primary | ICD-10-CM

## 2019-03-18 DIAGNOSIS — M79.10 MYALGIA: ICD-10-CM

## 2019-03-18 PROCEDURE — 97110 THERAPEUTIC EXERCISES: CPT | Performed by: CHIROPRACTOR

## 2019-03-18 PROCEDURE — 98940 CHIROPRACT MANJ 1-2 REGIONS: CPT | Mod: AT | Performed by: CHIROPRACTOR

## 2019-03-21 NOTE — PROGRESS NOTES
Subjective:  CC: R lateral leg  Visit: 39  Goal: Speed work without minimal discomfort  Comes in today doing better. Notes her running is going well. Since last visit has raced 8K and 1/2 marathon. Has been feeling better and better. She continues to work on active care program and that helps. She denies any new issues. Notes right lateral leg tightness. Denies any new changes in health history.     Objective:  Inspection:  mild thickening over right achilles   No Scars  Normal gait    Palpation:  Palpable soreness over R IT, R BF, R VL, R TFL  Myofascitis 2/4 noted over R IT, R BF, R hip ER's    ROM:  Hip IR limited on left 5 degrees compared to right  Ankle DF limited on right with no pain  Back extension 30/30 with mild lower back pain  Hip adduction full and pain free  SLR symmetric  tightness noted over R hamstring   Knee flexion full with lateral knee pain     MMT:  Left glute med 4/5 with no pain  Right glute med 4/5 with no pain  TFL 4/5 B with lateral hip discomfort  Heel raise 5/5 with pain over right achilles   Hamstrings 5/5 B with mild discomfort over R hamstring     MET:  Left out flare    NAL:  Restricted SI on right    Ortho: SLR (tightness only), sloan and armandoer (-), jocelyn (-)    Assessment:  NAL with associated myofascitis and weakness   Distal hamstring tendinitis     Plan:   Patient tolerated treatment well today  Treatment Time: 45 minutes  16629 manipulation 1-2 segments: MET  64373 manipulation: ankle LAD  44243 Manual therapy: (ART, Graston, Strain Counter Strain, Fascial Manipulation, Cupping) performed over area of bilateral hamstrings, R calf, R lateral quad, B TFL, R hamstring  86746 therapeutic exercise (20 minutes):   1. Calf stretching for gastroc and soleus  2. Self plantar stretching  3. Single leg balance on [place] cushion  4. TFL stretching  5. Lateral hamstring stretching  6. Standing TFL stretching  7. side lying stretching over stability ball, piriformis stretching, marching  bridges with ball squeeze   8.  straight leg bridges on stability ball, active isolated hamstring stretches, seated piriformis stretch  9.  only worked lower extremity stretching over quads, hamstrings, TFL, psoas.   10.  hip out flare exercises: left hip abduction, standing running pose with abduction, bridge with abduction, self active isolated stretching. We did review single leg step back and added some insight to make sure she feels it in her glute. Also review current PT exercises   11. she is seeing PT. Today we focused on hip stretching (TFL, hip ER). Condensed exercises down as she notes she is spending 1 hour a day doing them. We focused on stretching and hip extension exercises for glute strength. Added eccentric calfs   12.  single leg balance with opposite arm row, walking lunge with heel raise for balance, single leg paw drill, single leg bridge, figure 4 stretch  single leg bride, clam shell, side lying abduction, lateral band walks, cat / cow stretching, running RDL  13. L protocol for hamstring  Today: standing fire hydrants red band, curls on stability ball, L protocol  US: 5 minutes over R distal lateral hamstring (did not do)  Shock wave: 15/8 over lateral hamstring (did not do)  Strapping:  Hip IR   Next visit: PRN as does well with active care program

## 2019-04-04 ENCOUNTER — THERAPY VISIT (OUTPATIENT)
Dept: CHIROPRACTIC MEDICINE | Facility: CLINIC | Age: 54
End: 2019-04-04
Payer: COMMERCIAL

## 2019-04-04 DIAGNOSIS — M79.10 MYALGIA: ICD-10-CM

## 2019-04-04 DIAGNOSIS — M99.05 SOMATIC DYSFUNCTION OF PELVIS REGION: Primary | ICD-10-CM

## 2019-04-04 DIAGNOSIS — M25.551 HIP PAIN, RIGHT: ICD-10-CM

## 2019-04-04 PROCEDURE — 98940 CHIROPRACT MANJ 1-2 REGIONS: CPT | Mod: AT | Performed by: CHIROPRACTOR

## 2019-04-04 NOTE — PROGRESS NOTES
Referral for BFR for right leg weakness. Currently working on L protocol and standing fire hydrants.     Subjective:  CC: R lateral leg  Goal: Speed work without minimal discomfort  Comes in today doing OK. Has days that feel great and some days with weakness in right leg. No specific pain. Notes training for 100 mile race in 6 weeks. We talked about building strength and doing BFR and she thought that was good idea. Pain is 3/10 on average. Worse with trail running and fatigue. Denies any new issues.     Objective:  Inspection:  mild thickening over right achilles   No Scars  Normal gait    Palpation:  Palpable soreness over R IT, R BF, R VL, R TFL  Myofascitis 2/4 noted over R IT, R BF, R hip ER's    ROM:  Hip IR limited on left 5 degrees compared to right  Ankle DF limited on right with no pain  Back extension 30/30 with mild lower back pain  Hip adduction full and pain free  SLR symmetric  tightness noted over R hamstring   Knee flexion full with lateral knee pain     MMT:  Left glute med 4/5 with no pain  Right glute med 4/5 with no pain  TFL 4/5 B with lateral hip discomfort  Heel raise 5/5 with pain over right achilles   Hamstrings 5/5 B with mild discomfort over R hamstring     MET:  Left out flare    NAL:  Restricted SI on right    Ortho: SLR (tightness only), sloan and armandoer (-), jocelyn (-)    Assessment:  NAL with associated myofascitis and weakness   Distal hamstring tendinitis     Plan:   Patient tolerated treatment well today  Treatment Time: 45 minutes  82179 manipulation 1-2 segments: MET  20111 manipulation: ankle LAD  68859 Manual therapy: (ART, Graston, Strain Counter Strain, Fascial Manipulation, Cupping) performed over area of bilateral hamstrings, R calf, R lateral quad, B TFL, R hamstring  68192 therapeutic exercise (20 minutes):   1. Calf stretching for gastroc and soleus  2. Self plantar stretching  3. Single leg balance on [place] cushion  4. TFL stretching  5. Lateral hamstring  stretching  6. Standing TFL stretching  7. side lying stretching over stability ball, piriformis stretching, marching bridges with ball squeeze   8.  straight leg bridges on stability ball, active isolated hamstring stretches, seated piriformis stretch  9.  only worked lower extremity stretching over quads, hamstrings, TFL, psoas.   10.  hip out flare exercises: left hip abduction, standing running pose with abduction, bridge with abduction, self active isolated stretching. We did review single leg step back and added some insight to make sure she feels it in her glute. Also review current PT exercises   11. she is seeing PT. Today we focused on hip stretching (TFL, hip ER). Condensed exercises down as she notes she is spending 1 hour a day doing them. We focused on stretching and hip extension exercises for glute strength. Added eccentric calfs   12.  single leg balance with opposite arm row, walking lunge with heel raise for balance, single leg paw drill, single leg bridge, figure 4 stretch  single leg bride, clam shell, side lying abduction, lateral band walks, cat / cow stretching, running RDL  13. L protocol for hamstring  Today: standing fire hydrants red band, curls on stability ball, L protocol  US: 5 minutes over R distal lateral hamstring (did not do)  Shock wave: 15/8 over lateral hamstring (did not do)  Strapping:  Hip IR   Next visit: PRN as does well with active care program

## 2019-04-09 ENCOUNTER — THERAPY VISIT (OUTPATIENT)
Dept: PHYSICAL THERAPY | Facility: CLINIC | Age: 54
End: 2019-04-09
Payer: COMMERCIAL

## 2019-04-09 DIAGNOSIS — M79.10 MYALGIA: ICD-10-CM

## 2019-04-09 DIAGNOSIS — M25.551 HIP PAIN, RIGHT: ICD-10-CM

## 2019-04-09 DIAGNOSIS — M99.05 SOMATIC DYSFUNCTION OF PELVIS REGION: ICD-10-CM

## 2019-04-09 PROCEDURE — 97110 THERAPEUTIC EXERCISES: CPT | Mod: GP | Performed by: PHYSICAL THERAPIST

## 2019-04-09 PROCEDURE — 97161 PT EVAL LOW COMPLEX 20 MIN: CPT | Mod: GP | Performed by: PHYSICAL THERAPIST

## 2019-04-09 NOTE — PROGRESS NOTES
[facility] Initial Evaluation  Subjective:    Ale Rose is a 53 year old female with a right knee (Distal lateral hamstring tendonitis) condition.  Condition occurred with:  Insidious onset and repetition/overuse.  Condition occurred: during recreation/sport (running).  This is a recurrent and chronic condition  No specific date of injury  .    Site of Pain: distal lateral right hamstring.    Pain is described as aching and sharp and is intermittent and reported as 1/10.  Associated symptoms:  Buckling/giving out. Pain is the same all the time.  Symptoms are exacerbated by running, ascending stairs and descending stairs (running and having to adjust to different surfaces or heights (curbs, etc)) Relieved by: manual therapy and exercise.  Since onset symptoms are gradually improving.  Special testing: none.  Previous treatment includes chiropractic (manual therapy and exercise).  There was significant improvement following previous treatment.  General health as reported by patient is good.  Pertinent medical history includes:  Thyroid problems.  Medical allergies: yes (penicillin).  Other surgeries include:  Other (gall bladder).  Current medications:  Thyroid medication.  Current occupation is SW  .  Patient is working in normal job without restrictions.  Primary job tasks include:  Driving and prolonged sitting.    Barriers: High mileage runner.                            Objective:    Gait:    Gait Type:  Normal   Assistive Devices:  None      Flexibility/Screens:   Positive screens:  KneeNegative screens: Hip or Lumbar                                                      Hip Evaluation    Hip Strength:    Flexion:   Left: 5/5   Pain:  Right: 5/5   Pain:                    Extension:  Left: 4+/5  Pain:Right: 5-/5    Pain:    Abduction:  Left: 4+/5     Pain:Right: 5-/5    Pain:      External Rotation:  Left: 5-/5   Pain:  Right: 5/5   Pain:                     Knee  Evaluation:  ROM:  AROM: normal  PROM: normal            Strength:     Extension:  Left: 5/5   Pain:      Right: 5/5   Pain:  Flexion:  Left: 4+/5   Pain:      Right: 4+/5   Pain:                      Date  4/9/19 Limb Occlusion Pressure  173 Percent (%) Occlusion  80 Training Occlusion Pressure  138 Exercises Performed  1. SL Leg Press: 2.5 pl - 30-15-15-15  2. Balance Board squat: 30-15-15-15   Total time under tourniquet  14 min   Immediate effects  Fatigue, Mild cramping at the beginning of sets Lingering effects (from previous session)  NA     NOTES: Ale tolerated initial BFR session well    Blood Flow Restriction Training: Contraindications and precautions reviewed, pt safe for use of  modality, Risks and benefits discussed; pt gave informed consent    Assessment/Plan:    Patient is a 53 year old female with right side knee complaints.    Patient has the following significant findings with corresponding treatment plan.                Diagnosis 1:  Right Distal Hamstring Tendinoplasty  Pain -  home program, BFRT and therapeutic exercise  Decreased strength - therapeutic exercise, therapeutic activities and home program  Impaired muscle performance - neuro re-education, home program and therapeutic exercise  Decreased function - therapeutic activities and home program    Therapy Evaluation Codes:   1) History comprised of:   Personal factors that impact the plan of care:      None.    Comorbidity factors that impact the plan of care are:      None.     Medications impacting care: None.  2) Examination of Body Systems comprised of:   Body structures and functions that impact the plan of care:      Knee.   Activity limitations that impact the plan of care are:      Running, Stairs and Walking.  3) Clinical presentation characteristics are:   Stable/Uncomplicated.  4) Decision-Making    Low complexity using standardized patient assessment instrument and/or measureable assessment of functional outcome.  Cumulative  Therapy Evaluation is: Low complexity.    Previous and current functional limitations:  (See Goal Flow Sheet for this information)    Short term and Long term goals: (See Goal Flow Sheet for this information)     Communication ability:  Patient appears to be able to clearly communicate and understand verbal and written communication and follow directions correctly.  Treatment Explanation - The following has been discussed with the patient:   RX ordered/plan of care  Anticipated outcomes  Possible risks and side effects  This patient would benefit from PT intervention to resume normal activities.   Rehab potential is excellent.    Frequency:  2 X week, once daily  Duration:  for 6 weeks  Discharge Plan:  Achieve all LTG.  Independent in home treatment program.  Reach maximal therapeutic benefit.    Please refer to the daily flowsheet for treatment today, total treatment time and time spent performing 1:1 timed codes.

## 2019-04-12 ENCOUNTER — THERAPY VISIT (OUTPATIENT)
Dept: PHYSICAL THERAPY | Facility: CLINIC | Age: 54
End: 2019-04-12
Payer: COMMERCIAL

## 2019-04-12 DIAGNOSIS — M76.892 HAMSTRING TENDINITIS OF LEFT THIGH: ICD-10-CM

## 2019-04-12 PROCEDURE — 97110 THERAPEUTIC EXERCISES: CPT | Mod: GP | Performed by: PHYSICAL THERAPIST

## 2019-04-12 NOTE — PROGRESS NOTES
Date  4/12/19 Limb Occlusion Pressure  173 Percent (%) Occlusion  80 Training Occlusion Pressure  138 Exercises Performed  1. SL Leg Press: 2.5 pl - 30-15-15-15  2. Balance Board squat: 30-15-15-15   Total time under tourniquet  15 min   Immediate effects  Fatigue,  Mild discomfort Lingering effects (from previous session)  none     NOTES: Ale reported that she tolerated the treatment well last visit.  Has fatigue for a day but no soreness.  The following day she felt back to normal.    Blood Flow Restriction Training: Contraindications and precautions reviewed, pt safe for use of  modality, Risks and benefits discussed; pt gave informed consent

## 2019-04-16 ENCOUNTER — THERAPY VISIT (OUTPATIENT)
Dept: PHYSICAL THERAPY | Facility: CLINIC | Age: 54
End: 2019-04-16
Payer: COMMERCIAL

## 2019-04-16 DIAGNOSIS — M76.892 HAMSTRING TENDINITIS OF LEFT THIGH: ICD-10-CM

## 2019-04-16 PROCEDURE — 97110 THERAPEUTIC EXERCISES: CPT | Mod: GP | Performed by: PHYSICAL THERAPIST

## 2019-04-19 ENCOUNTER — THERAPY VISIT (OUTPATIENT)
Dept: PHYSICAL THERAPY | Facility: CLINIC | Age: 54
End: 2019-04-19
Payer: COMMERCIAL

## 2019-04-19 DIAGNOSIS — M76.892 HAMSTRING TENDINITIS OF LEFT THIGH: ICD-10-CM

## 2019-04-19 PROCEDURE — 97110 THERAPEUTIC EXERCISES: CPT | Mod: GP | Performed by: PHYSICAL THERAPIST

## 2019-04-19 NOTE — PROGRESS NOTES
Date  4/19/19 Limb Occlusion Pressure  173 Percent (%) Occlusion  80 Training Occlusion Pressure  138 Exercises Performed  1. SL Leg Press: 2.5 pl - 30-15-15-15  2. Balance Board squat: 30-15-15-15   Total time under tourniquet  15   Immediate effects  Discomfort, fatigue Lingering effects (from previous session)  none     NOTES: reported near muscle failure with the exercise    Blood Flow Restriction Training: Contraindications and precautions reviewed, pt safe for use of  modality, Risks and benefits discussed; pt gave informed consent

## 2019-04-22 ENCOUNTER — THERAPY VISIT (OUTPATIENT)
Dept: CHIROPRACTIC MEDICINE | Facility: CLINIC | Age: 54
End: 2019-04-22
Payer: COMMERCIAL

## 2019-04-22 DIAGNOSIS — M79.10 MYALGIA: ICD-10-CM

## 2019-04-22 DIAGNOSIS — M99.05 SOMATIC DYSFUNCTION OF PELVIS REGION: Primary | ICD-10-CM

## 2019-04-22 DIAGNOSIS — M25.551 HIP PAIN, RIGHT: ICD-10-CM

## 2019-04-22 PROCEDURE — 97110 THERAPEUTIC EXERCISES: CPT | Performed by: CHIROPRACTOR

## 2019-04-22 PROCEDURE — 98940 CHIROPRACT MANJ 1-2 REGIONS: CPT | Mod: AT | Performed by: CHIROPRACTOR

## 2019-04-22 NOTE — PROGRESS NOTES
Subjective:  CC: R lateral leg  Goal: Speed work without minimal discomfort  Comes in today doing OK. Notes sore with BFR but does think it is progressing well. Notes running after is very hard. Has had less giving out of her leg which she is pleased about. Denies any new issues. Pain is 3/10. Training fully running very well.     Objective:  Inspection:  mild thickening over right achilles   No Scars  Normal gait    Palpation:  Palpable soreness over R IT, R BF, R VL, R TFL  Myofascitis 2/4 noted over R IT, R BF, R hip ER's    ROM:  Hip IR limited on left 5 degrees compared to right  Ankle DF limited on right with no pain  Back extension 30/30 with mild lower back pain  Hip adduction full and pain free  SLR symmetric  tightness noted over R hamstring   Knee flexion full with lateral knee pain     MMT:  Left glute med 4/5 with no pain  Right glute med 4/5 with no pain  TFL 4/5 B with lateral hip discomfort  Heel raise 5/5 with pain over right achilles   Hamstrings 5/5 B with mild discomfort over R hamstring     MET:  Left out flare    NAL:  Restricted SI on right    Ortho: SLR (tightness only), sloan and armandoer (-), jocelyn (-)    Assessment:  NAL with associated myofascitis and weakness   Distal hamstring tendinitis     Plan:   Patient tolerated treatment well today  Treatment Time: 45 minutes  32159 manipulation 1-2 segments: MET  96904 manipulation: ankle LAD  09067 Manual therapy: (ART, Graston, Strain Counter Strain, Fascial Manipulation, Cupping) performed over area of bilateral hamstrings, R calf, R lateral quad, B TFL, R hamstring  87515 therapeutic exercise (20 minutes):   1. Calf stretching for gastroc and soleus  2. Self plantar stretching  3. Single leg balance on [place] cushion  4. TFL stretching  5. Lateral hamstring stretching  6. Standing TFL stretching  7. side lying stretching over stability ball, piriformis stretching, marching bridges with ball squeeze   8.  straight leg bridges on stability  ball, active isolated hamstring stretches, seated piriformis stretch  9.  only worked lower extremity stretching over quads, hamstrings, TFL, psoas.   10.  hip out flare exercises: left hip abduction, standing running pose with abduction, bridge with abduction, self active isolated stretching. We did review single leg step back and added some insight to make sure she feels it in her glute. Also review current PT exercises   11. she is seeing PT. Today we focused on hip stretching (TFL, hip ER). Condensed exercises down as she notes she is spending 1 hour a day doing them. We focused on stretching and hip extension exercises for glute strength. Added eccentric calfs   12.  single leg balance with opposite arm row, walking lunge with heel raise for balance, single leg paw drill, single leg bridge, figure 4 stretch  single leg bride, clam shell, side lying abduction, lateral band walks, cat / cow stretching, running RDL  13. L protocol for hamstring  Today: standing fire hydrants red band, curls on stability ball, L protocol  US: 5 minutes over R distal lateral hamstring (did not do)  Shock wave: 15/8 over lateral hamstring (did not do)  Strapping:  Hip IR   Next visit: PRN as does well with active care program

## 2019-04-23 ENCOUNTER — THERAPY VISIT (OUTPATIENT)
Dept: PHYSICAL THERAPY | Facility: CLINIC | Age: 54
End: 2019-04-23
Payer: COMMERCIAL

## 2019-04-23 DIAGNOSIS — M76.892 HAMSTRING TENDINITIS OF LEFT THIGH: Primary | ICD-10-CM

## 2019-04-23 PROCEDURE — 97110 THERAPEUTIC EXERCISES: CPT | Mod: GP | Performed by: PHYSICAL THERAPIST

## 2019-04-23 NOTE — PROGRESS NOTES
Date  4/23/19 Limb Occlusion Pressure  173 Percent (%) Occlusion  80 Training Occlusion Pressure  138 Exercises Performed  SL Leg Press: 2.5 pl, 30-15-15-15    Split Squat: 30-15-15-15   Total time under tourniquet  14   Immediate effects  Fatigue  Mild cramping Lingering effects (from previous session)  none     NOTES: tolerated treatment well    Blood Flow Restriction Training: Contraindications and precautions reviewed, pt safe for use of  modality, Risks and benefits discussed; pt gave informed consent

## 2019-04-25 ENCOUNTER — THERAPY VISIT (OUTPATIENT)
Dept: PHYSICAL THERAPY | Facility: CLINIC | Age: 54
End: 2019-04-25
Payer: COMMERCIAL

## 2019-04-25 DIAGNOSIS — M76.892 HAMSTRING TENDINITIS OF LEFT THIGH: ICD-10-CM

## 2019-04-25 PROCEDURE — 97110 THERAPEUTIC EXERCISES: CPT | Mod: GP | Performed by: PHYSICAL THERAPIST

## 2019-04-25 NOTE — PROGRESS NOTES
Date  4/25/19 Limb Occlusion Pressure  173 Percent (%) Occlusion  80 Training Occlusion Pressure  138 Exercises Performed  SL leg press: 3 pl- 30-15-15-15    8 in lateral step down: 30-15-15-15   Total time under tourniquet  14   Immediate effects  9-10/10 fatigue Lingering effects (from previous session)  Left hip discomfort from split squat position      NOTES: step down exercise did not cause any discomfort in the left hip.    Blood Flow Restriction Training: Contraindications and precautions reviewed, pt safe for use of  modality, Risks and benefits discussed; pt gave informed consent

## 2019-04-30 ENCOUNTER — THERAPY VISIT (OUTPATIENT)
Dept: PHYSICAL THERAPY | Facility: CLINIC | Age: 54
End: 2019-04-30
Payer: COMMERCIAL

## 2019-04-30 DIAGNOSIS — M76.892 HAMSTRING TENDINITIS OF LEFT THIGH: ICD-10-CM

## 2019-04-30 PROCEDURE — 97110 THERAPEUTIC EXERCISES: CPT | Mod: GP | Performed by: PHYSICAL THERAPIST

## 2019-04-30 NOTE — PROGRESS NOTES
Date  4/30/19 Limb Occlusion Pressure  173 Percent (%) Occlusion  80 Training Occlusion Pressure  138 Exercises Performed  SL leg press: 30-15-15-15, 3 pl    SL step down: 8 in, 30-15-15-15   Total time under tourniquet  14 min   Immediate effects  Fatigue, discomfort, lightheadedness at end of step down Lingering effects (from previous session)  none     NOTES: Patient reported improved running over the weekend    Blood Flow Restriction Training: Contraindications and precautions reviewed, pt safe for use of  modality, Risks and benefits discussed; pt gave informed consent

## 2019-05-02 ENCOUNTER — THERAPY VISIT (OUTPATIENT)
Dept: PHYSICAL THERAPY | Facility: CLINIC | Age: 54
End: 2019-05-02
Payer: COMMERCIAL

## 2019-05-02 DIAGNOSIS — M76.892 HAMSTRING TENDINITIS OF LEFT THIGH: ICD-10-CM

## 2019-05-02 PROCEDURE — 97110 THERAPEUTIC EXERCISES: CPT | Mod: GP | Performed by: PHYSICAL THERAPIST

## 2019-05-03 NOTE — PROGRESS NOTES
Date  5/2/19 Limb Occlusion Pressure  173 Percent (%) Occlusion  80 Training Occlusion Pressure  138 Exercises Performed  SL leg press: 3 pl - 30-15-15-15    8 in step down: 30-15-15-15   Total time under tourniquet  14   Immediate effects  Fatigue, discomfort, loghtheadedness Lingering effects (from previous session)  none3     NOTES: Continues to report improvement in her running ability and less pain during her workouts    Blood Flow Restriction Training: Contraindications and precautions reviewed, pt safe for use of  modality, Risks and benefits discussed; pt gave informed consent

## 2019-05-07 ENCOUNTER — THERAPY VISIT (OUTPATIENT)
Dept: PHYSICAL THERAPY | Facility: CLINIC | Age: 54
End: 2019-05-07
Payer: COMMERCIAL

## 2019-05-07 DIAGNOSIS — M76.892 HAMSTRING TENDINITIS OF LEFT THIGH: ICD-10-CM

## 2019-05-07 PROCEDURE — 97110 THERAPEUTIC EXERCISES: CPT | Mod: GP | Performed by: PHYSICAL THERAPIST

## 2019-05-08 NOTE — PROGRESS NOTES
Date  5/7/19 Limb Occlusion Pressure  173 Percent (%) Occlusion  80 Training Occlusion Pressure  138 Exercises Performed  SL Leg Press  4 pl, 30-15-15-15    8 in step down  30-15-15-15   Total time under tourniquet  14 min   Immediate effects  Fatigue, mild discomfort Lingering effects (from previous session)  none     NOTES:  Reports she has had some of the nerve pain when on a run recently.  No lightheadedness today     Blood Flow Restriction Training: Contraindications and precautions reviewed, pt safe for use of  modality, Risks and benefits discussed; pt gave informed consent

## 2019-05-10 ENCOUNTER — THERAPY VISIT (OUTPATIENT)
Dept: PHYSICAL THERAPY | Facility: CLINIC | Age: 54
End: 2019-05-10
Payer: COMMERCIAL

## 2019-05-10 DIAGNOSIS — M76.892 HAMSTRING TENDINITIS OF LEFT THIGH: ICD-10-CM

## 2019-05-10 PROCEDURE — 97110 THERAPEUTIC EXERCISES: CPT | Mod: GP | Performed by: PHYSICAL THERAPIST

## 2019-05-10 NOTE — PROGRESS NOTES
Date  5/10/19 Limb Occlusion Pressure  173 Percent (%) Occlusion  80 Training Occlusion Pressure  130 Exercises Performed  SL Leg Press  4 pl x 30-15-15-15    SL Lateral Step down, 8 in  30-15-15-15   Total time under tourniquet  14   Immediate effects  Discomfort, fatigue Lingering effects (from previous session)  none     NOTES: tolerated treatment well today    Blood Flow Restriction Training: Contraindications and precautions reviewed, pt safe for use of  modality, Risks and benefits discussed; pt gave informed consent

## 2019-05-14 ENCOUNTER — THERAPY VISIT (OUTPATIENT)
Dept: PHYSICAL THERAPY | Facility: CLINIC | Age: 54
End: 2019-05-14
Payer: COMMERCIAL

## 2019-05-14 DIAGNOSIS — M76.892 HAMSTRING TENDINITIS OF LEFT THIGH: ICD-10-CM

## 2019-05-14 PROCEDURE — 97110 THERAPEUTIC EXERCISES: CPT | Mod: GP | Performed by: PHYSICAL THERAPIST

## 2019-05-15 ENCOUNTER — THERAPY VISIT (OUTPATIENT)
Dept: CHIROPRACTIC MEDICINE | Facility: CLINIC | Age: 54
End: 2019-05-15
Payer: COMMERCIAL

## 2019-05-15 DIAGNOSIS — M25.551 HIP PAIN, RIGHT: ICD-10-CM

## 2019-05-15 DIAGNOSIS — M99.05 SOMATIC DYSFUNCTION OF PELVIS REGION: Primary | ICD-10-CM

## 2019-05-15 DIAGNOSIS — M79.10 MYALGIA: ICD-10-CM

## 2019-05-15 PROCEDURE — 97110 THERAPEUTIC EXERCISES: CPT | Performed by: CHIROPRACTOR

## 2019-05-15 PROCEDURE — 98940 CHIROPRACT MANJ 1-2 REGIONS: CPT | Mod: AT | Performed by: CHIROPRACTOR

## 2019-05-15 NOTE — PROGRESS NOTES
Subjective:  CC: R lateral leg  Goal: Speed work without minimal discomfort  Comes in today doing OK. Notes that still not doing well with leg. Felt good after last session. Notes that BFR does not seem to be helping. Notes that right leg is still giving out. Has been doing some races. Notes long run feels it more. Has not had as much body work done. Notes pain has moved to non specific lateral leg. She is training full go. Denies any back pain or other symtpoms.     Objective:  Inspection:  mild thickening over right achilles   No Scars  Normal gait    Palpation:  Palpable soreness over R IT, R BF, R VL, R TFL  Myofascitis 2/4 noted over R IT, R BF, R hip ER's    ROM:  Hip IR limited on left 5 degrees compared to right  Ankle DF limited on right with no pain  Back extension 30/30 with mild lower back pain  Hip adduction full and pain free  SLR symmetric  tightness noted over R hamstring   Knee flexion full with lateral knee pain     MMT:  Left glute med 4/5 with no pain  Right glute med 4/5 with no pain  TFL 4/5 B with lateral hip discomfort  Heel raise 5/5 with pain over right achilles   Hamstrings 5/5 B with mild discomfort over R hamstring     MET:  Left out flare    NAL:  Restricted SI on right    Ortho: SLR (tightness only), sloan and armandoer (-), jocelyn (-)    Assessment:  NAL with associated myofascitis and weakness   Distal hamstring tendinitis     Plan:   Patient tolerated treatment well today  Treatment Time: 45 minutes  95284 manipulation 1-2 segments: MET  39222 manipulation: ankle LAD  69906 Manual therapy: (ART, Graston, Strain Counter Strain, Fascial Manipulation, Cupping) performed over area of bilateral hamstrings, R calf, R lateral quad, B TFL, R hamstring  07326 therapeutic exercise (20 minutes):   1. Calf stretching for gastroc and soleus  2. Self plantar stretching  3. Single leg balance on [place] cushion  4. TFL stretching  5. Lateral hamstring stretching  6. Standing TFL stretching  7. side  lying stretching over stability ball, piriformis stretching, marching bridges with ball squeeze   8.  straight leg bridges on stability ball, active isolated hamstring stretches, seated piriformis stretch  9.  only worked lower extremity stretching over quads, hamstrings, TFL, psoas.   10.  hip out flare exercises: left hip abduction, standing running pose with abduction, bridge with abduction, self active isolated stretching. We did review single leg step back and added some insight to make sure she feels it in her glute. Also review current PT exercises   11. she is seeing PT. Today we focused on hip stretching (TFL, hip ER). Condensed exercises down as she notes she is spending 1 hour a day doing them. We focused on stretching and hip extension exercises for glute strength. Added eccentric calfs   12.  single leg balance with opposite arm row, walking lunge with heel raise for balance, single leg paw drill, single leg bridge, figure 4 stretch  single leg bride, clam shell, side lying abduction, lateral band walks, cat / cow stretching, running RDL  13. L protocol for hamstring  Today: standing fire hydrants red band, curls on stability ball, L protocol  US: 5 minutes over R distal lateral hamstring (did not do)  Shock wave: 15/8 over lateral hamstring (did not do)  Strapping:  Hip IR   Next visit: PRN as does well with active care program

## 2019-05-16 NOTE — PROGRESS NOTES
Date  5/14/19 Limb Occlusion Pressure  173 Percent (%) Occlusion  80 - decreased to 70 Training Occlusion Pressure  130 initially and then lowered to 121 Exercises Performed  SL Leg Press  4 pl x 30-15-15-15     SL Lateral Step down, 8 in  30-15-15-15   Total time under tourniquet  14 min   Immediate effects  Increased discomfort today (resolved with decrease pressure) Lingering effects (from previous session)  none     NOTES: Patient experienced some increased discomfort with the 1st set at 80 % occlusion which resolved with decreasing the training pressure today to 70%.    Blood Flow Restriction Training: Contraindications and precautions reviewed, pt safe for use of  modality, Risks and benefits discussed; pt gave informed consent

## 2019-05-17 ENCOUNTER — THERAPY VISIT (OUTPATIENT)
Dept: PHYSICAL THERAPY | Facility: CLINIC | Age: 54
End: 2019-05-17
Payer: COMMERCIAL

## 2019-05-17 DIAGNOSIS — M76.892 HAMSTRING TENDINITIS OF LEFT THIGH: ICD-10-CM

## 2019-05-17 PROCEDURE — 97110 THERAPEUTIC EXERCISES: CPT | Mod: GP | Performed by: PHYSICAL THERAPIST

## 2019-05-17 NOTE — PROGRESS NOTES
Date  5/17/19 Limb Occlusion Pressure  200 Percent (%) Occlusion  80 Training Occlusion Pressure  160 Exercises Performed  SL Leg Press: 3 pl, SH 3, 30-15-15-15    SL Step Down, 8 in, 30-15-15-15   Total time under tourniquet  14   Immediate effects    fatigue Lingering effects (from previous session)  none     NOTES: Patient reports that she has been having more pain in the lateral knee when running.  Was worse after manual work with Hesham Rossi but not sure why because she has had the same treatment in the past.     Blood Flow Restriction Training: Contraindications and precautions reviewed, pt safe for use of  modality, Risks and benefits discussed; pt gave informed consent

## 2019-05-21 ENCOUNTER — THERAPY VISIT (OUTPATIENT)
Dept: PHYSICAL THERAPY | Facility: CLINIC | Age: 54
End: 2019-05-21
Payer: COMMERCIAL

## 2019-05-21 DIAGNOSIS — M76.892 HAMSTRING TENDINITIS OF LEFT THIGH: ICD-10-CM

## 2019-05-21 PROCEDURE — 97110 THERAPEUTIC EXERCISES: CPT | Mod: GP | Performed by: PHYSICAL THERAPIST

## 2019-05-21 NOTE — PROGRESS NOTES
Date  5/21/19 Limb Occlusion Pressure  200 Percent (%) Occlusion  80 Training Occlusion Pressure  160 Exercises Performed  SL Leg Press: 3 pl, SH 3, 30-15-15-15    SL Step Down, 8 in, 30-15-15-15 Total time under tourniquet  14   Immediate effects  Fatigue, discomfort Lingering effects (from previous session)  none     NOTES: no new complaints.  Running has returned to baseline.  No significant change overall     Blood Flow Restriction Training: Contraindications and precautions reviewed, pt safe for use of  modality, Risks and benefits discussed; pt gave informed consent

## 2019-05-24 ENCOUNTER — THERAPY VISIT (OUTPATIENT)
Dept: PHYSICAL THERAPY | Facility: CLINIC | Age: 54
End: 2019-05-24
Payer: COMMERCIAL

## 2019-05-24 DIAGNOSIS — M76.892 HAMSTRING TENDINITIS OF LEFT THIGH: ICD-10-CM

## 2019-05-24 PROCEDURE — 97110 THERAPEUTIC EXERCISES: CPT | Mod: GP | Performed by: PHYSICAL THERAPIST

## 2019-05-24 NOTE — PROGRESS NOTES
Date  5/24/19 Limb Occlusion Pressure  200 Percent (%) Occlusion  80 Training Occlusion Pressure  160 Exercises Performed  SL Leg press, 3 pl, SH 3  30-15-15-15    SL Step down, 8 in  30-15-15-15   Total time under tourniquet  14   Immediate effects  Discomfort, fatigue Lingering effects (from previous session)  none     NOTES: Has her race in one week.  This will be her final BFR session.    Blood Flow Restriction Training: Contraindications and precautions reviewed, pt safe for use of  modality, Risks and benefits discussed; pt gave informed consent

## 2019-07-12 ENCOUNTER — THERAPY VISIT (OUTPATIENT)
Dept: CHIROPRACTIC MEDICINE | Facility: CLINIC | Age: 54
End: 2019-07-12
Payer: COMMERCIAL

## 2019-07-12 DIAGNOSIS — M25.551 HIP PAIN, RIGHT: ICD-10-CM

## 2019-07-12 DIAGNOSIS — M79.10 MYALGIA: ICD-10-CM

## 2019-07-12 DIAGNOSIS — M99.05 SOMATIC DYSFUNCTION OF PELVIS REGION: Primary | ICD-10-CM

## 2019-07-12 PROCEDURE — 97110 THERAPEUTIC EXERCISES: CPT | Performed by: CHIROPRACTOR

## 2019-07-12 PROCEDURE — 98940 CHIROPRACT MANJ 1-2 REGIONS: CPT | Mod: AT | Performed by: CHIROPRACTOR

## 2019-07-12 NOTE — PROGRESS NOTES
Subjective:  CC: R lateral leg  Goal: Speed work without minimal discomfort  Comes in today doing well. Since last time finished BFR, ran 100 mile and felt great the last 30 miles, took 2 weeks off and started running again. Notes he is better now. Still has similar symptoms, but back training. Has not had appointment with MD. Notes some lower back tightness. Denies any radiating pain. Denies any new changes in health history. Notes she is trying to take more time to handle stress and that is helping. Pleased with progress.     Objective:  Inspection:  mild thickening over right achilles   No Scars  Normal gait    Palpation:  Palpable soreness over R IT, R BF, R VL, R TFL, general lower back  Myofascitis 2/4 noted over R IT, R BF, R hip ER's, LPS    ROM:  Hip IR limited on left 5 degrees compared to right  Ankle DF limited on right with no pain  Back extension 30/30 with mild lower back pain  Hip adduction full and pain free  SLR symmetric  tightness noted over R hamstring   Knee flexion full with lateral knee pain     MMT:  Left glute med 4/5 with no pain  Right glute med 4/5 with no pain  TFL 4/5 B with lateral hip discomfort  Heel raise 5/5 with no pain   Hamstrings 5/5 B with no pain     MET:  Left out flare    NAL:  Restricted SI on right    Ortho: SLR (tightness only), sloan and armandoer (-)    Assessment:  NAL with associated myofascitis and weakness   Hip pain     Plan:   Patient tolerated treatment well today  Treatment Time: 45 minutes  41048 manipulation 1-2 segments: MET  89089 manipulation: ankle LAD  96297 Manual therapy: (ART, Graston, Strain Counter Strain, Fascial Manipulation, Cupping) performed over area of bilateral hamstrings, R calf, R lateral quad, B TFL, R hamstring  97596 therapeutic exercise (20 minutes):   1. Calf stretching for gastroc and soleus  2. Self plantar stretching  3. Single leg balance on [place] cushion  4. TFL stretching  5. Lateral hamstring stretching  6. Standing TFL  stretching  7. side lying stretching over stability ball, piriformis stretching, marching bridges with ball squeeze   8.  straight leg bridges on stability ball, active isolated hamstring stretches, seated piriformis stretch  9.  only worked lower extremity stretching over quads, hamstrings, TFL, psoas.   10.  hip out flare exercises: left hip abduction, standing running pose with abduction, bridge with abduction, self active isolated stretching. We did review single leg step back and added some insight to make sure she feels it in her glute. Also review current PT exercises   11. she is seeing PT. Today we focused on hip stretching (TFL, hip ER). Condensed exercises down as she notes she is spending 1 hour a day doing them. We focused on stretching and hip extension exercises for glute strength. Added eccentric calfs   12.  single leg balance with opposite arm row, walking lunge with heel raise for balance, single leg paw drill, single leg bridge, figure 4 stretch  single leg bride, clam shell, side lying abduction, lateral band walks, cat / cow stretching, running RDL  13. L protocol for hamstring  Today: standing fire hydrants red band, curls on stability ball, L protocol  US: 5 minutes over R distal lateral hamstring (did not do)  Shock wave: 15/8 over lateral hamstring (did not do)  Strapping:  Hip IR   Next visit: PRN as does well with active care program

## 2019-07-19 ENCOUNTER — OFFICE VISIT (OUTPATIENT)
Dept: ORTHOPEDICS | Facility: CLINIC | Age: 54
End: 2019-07-19
Payer: COMMERCIAL

## 2019-07-19 ENCOUNTER — ANCILLARY PROCEDURE (OUTPATIENT)
Dept: GENERAL RADIOLOGY | Facility: CLINIC | Age: 54
End: 2019-07-19
Attending: FAMILY MEDICINE
Payer: COMMERCIAL

## 2019-07-19 VITALS
DIASTOLIC BLOOD PRESSURE: 72 MMHG | SYSTOLIC BLOOD PRESSURE: 114 MMHG | WEIGHT: 140 LBS | HEIGHT: 69 IN | BODY MASS INDEX: 20.73 KG/M2

## 2019-07-19 DIAGNOSIS — M54.41 ACUTE RIGHT-SIDED LOW BACK PAIN WITH RIGHT-SIDED SCIATICA: ICD-10-CM

## 2019-07-19 DIAGNOSIS — M25.551 ACUTE PAIN OF RIGHT HIP: ICD-10-CM

## 2019-07-19 DIAGNOSIS — M25.551 ACUTE PAIN OF RIGHT HIP: Primary | ICD-10-CM

## 2019-07-19 DIAGNOSIS — M25.851 HIP IMPINGEMENT SYNDROME, RIGHT: ICD-10-CM

## 2019-07-19 PROCEDURE — 99204 OFFICE O/P NEW MOD 45 MIN: CPT | Performed by: FAMILY MEDICINE

## 2019-07-19 PROCEDURE — 73502 X-RAY EXAM HIP UNI 2-3 VIEWS: CPT | Mod: FY

## 2019-07-19 PROCEDURE — 72100 X-RAY EXAM L-S SPINE 2/3 VWS: CPT | Mod: FY

## 2019-07-19 ASSESSMENT — MIFFLIN-ST. JEOR: SCORE: 1301.48

## 2019-07-19 ASSESSMENT — ENCOUNTER SYMPTOMS: BACK PAIN: 1

## 2019-07-19 NOTE — LETTER
7/19/2019         RE: Ale Rose  [address]  [facility] [id number]        Dear Colleague,    Thank you for referring your patient, Ale Rose, to the [facility]. Please see a copy of my visit note below.    HPI   [facility]   Clinic Visit s Jul 19, 2019    PCP: No Ref-Primary, Physician      Ale is a 53 year old female who is seen as self referral for   Chief Complaint   Patient presents with     Right Leg - Pain       Injury: Patient describes injury from running. Has been working with Hesham Rossi for this problem.        Location of Pain: right hip/upper leg anterior  and lateral, nonradiating, she does have mild low back pain intermittently which is worse with sitting, and does not always correlate with the upper leg pain.    Pain is primarily in the proximal lateral leg sometimes radiating anteriorly, and down to the knee.      Duration of Pain: 6 month(s)  Rating of Pain at worst: 8/10  Rating of Pain Currently: 1/10  Pain is better with: activity avoidance   Pain is worse with: running  Treatment so far consists of: chiropractic care, ART, BFR, massage  Associated symptoms: muscle spasms, numbness in left big toe   Recent imaging completed: No recent imaging completed.  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       Past Medical History:   Diagnosis Date     Anemia B twelve deficiency      Hypothyroid        Patient Active Problem List    Diagnosis Date Noted     Somatic dysfunction of pelvis region 08/20/2017     Priority: Medium     Hip pain, right 08/20/2017     Priority: Medium     Myalgia 08/20/2017     Priority: Medium     Enthesopathy of hip 09/13/2010     Priority: Medium       Family History   Problem Relation Age of Onset     Diabetes Father      Hypertension Father      Arthritis Mother   "    Cancer Mother      Heart Disease Unknown      Cerebrovascular Disease Unknown      Respiratory Unknown        Social History     Socioeconomic History     Marital status:      Spouse name: Not on file     Number of children: Not on file     Years of education: Not on file     Highest education level: Not on file   Occupational History     Not on file   Social Needs     Financial resource strain: Not on file     Food insecurity:     Worry: Not on file     Inability: Not on file     Transportation needs:     Medical: Not on file     Non-medical: Not on file   Tobacco Use     Smoking status: Never Smoker   Substance and Sexual Activity     Alcohol use: Yes     Comment: rarely     Drug use: No       Past Surgical History:   Procedure Laterality Date     C STOMACH SURGERY PROCEDURE UNLISTED  2010    gallbladder removal             Review of Systems   Musculoskeletal: Positive for back pain and joint pain.   All other systems reviewed and are negative.        Physical Exam  /72   Ht 1.74 m (5' 8.5\")   Wt 63.5 kg (140 lb)   BMI 20.98 kg/m     Constitutional:well-developed, well-nourished, and in no distress.   Cardiovascular: Intact distal pulses.    Neurological: alert. Gait Normal:   Gait, station, stance, and balance appear normal for age  Skin: Skin is warm and dry.   Psychiatric: Mood and affect normal.   Respiratory: unlabored, speaks in full sentences  Lymph: no LAD, no lymphangitis    Posture lists slightly to the left.  No pain with hopping on the right.        Right Hip Exam     Tenderness   The patient is experiencing no tenderness.     Range of Motion   Abduction: normal   Adduction: normal   Extension: normal   Flexion: normal   External rotation: 40   Internal rotation: 25     Muscle Strength   Abduction: 5/5   Adduction: 5/5   Flexion: 5/5     Tests   DAVID: negative    Other   Erythema: absent  Scars: absent  Sensation: normal  Pulse: present    Comments:  Pain reproduced with internal " and external rotation as well as straight leg raise/flexion.  No pain with heel percussion or logrolling      Back Exam     Tenderness   The patient is experiencing tenderness in the sacroiliac (Right SI area tender only).    Range of Motion   Extension: normal   Flexion: normal Back flexion: Subtle elevation of left hemithorax with toe-touch.   Lateral bend right: normal   Lateral bend left: normal   Rotation right: normal   Rotation left: normal     Tests   Straight leg raise right: negative  Straight leg raise left: negative    Other   Toe walk: normal  Heel walk: normal  Gait: normal           X-ray images Ordered and independently reviewed by me in the office today with the patient. X-ray shows:     Recent Results (from the past 744 hour(s))   XR Lumbar Spine 2/3 Views    Narrative    XR LUMBAR SPINE 2-3 VIEWS  7/19/2019 4:23 PM     HISTORY: Acute right-sided low back pain with right-sided sciatica    COMPARISON: None.      Impression    IMPRESSION: There are 5 nonrib-bearing lumbar-type vertebral bodies.  Vertebral body heights are maintained. Alignment is normal. There is  evidence for mild facet arthropathy predominantly in the mid to lower  lumbar spine. There may be minimal disc space narrowing at L4-5.  Otherwise, the intervertebral disc spaces are normal. Surgical clips  noted in the right upper quadrant.    TERESA ARELLANO MD   XR Pelvis and Hip Right 1 View    Narrative    PELVIS AND RIGHT HIP ONE VIEW  7/19/2019 4:23 PM    HISTORY:  Acute pain of right hip    COMPARISON:  None.    FINDINGS:  No fracture or osseous lesion is seen. The joint spaces are  well preserved. An intrauterine device is seen within the mid pelvis.  No soft tissue pathology is seen.      Impression    IMPRESSION:  Unremarkable examination.    YOLANDE DOUGLAS MD       ASSESSMENT/PLAN    ICD-10-CM    1. Acute pain of right hip M25.551 XR Pelvis and Hip Right 1 View     MR Hip Right w/o Contrast   2. Acute right-sided low back  pain with right-sided sciatica M54.41 XR Lumbar Spine 2/3 Views   3. Hip impingement syndrome, right M25.851 MR Hip Right w/o Contrast     Symptoms are poorly defined and differential diagnosis includes radiculopathy versus right hip pathology.  With facet arthropathy on x-ray, the low back pain which is mild and intermittent seems to derive from this and may be unrelated to the hip.  Indeed, the hip itself shows a motion limitation consistent with a mild cam impingement syndrome.  On my review, the x-ray reflects cam impingement as well on the right.  No clear correlation on exam between back and leg symptoms therefore radiculopathy is deemed less likely.  We will proceed with an MRI of the hip and consider referral for therapy focused on hip impingement.      Again, thank you for allowing me to participate in the care of your patient.        Sincerely,        Luis Abdi MD

## 2019-07-19 NOTE — Clinical Note
АЛЕКСАНДР Dahl, her symptoms are hard to pin down.  I think I am leaning toward hip impingement.  MRI to confirm.  Would you want to work on rehab for this?  I am not real confident of our running therapists lately so thought I might send her to [facility] in [place] unless you want to take a step about it.

## 2019-07-19 NOTE — PROGRESS NOTES
[facility]   Clinic Visit s Jul 19, 2019    PCP: No Ref-Primary, Physician      Ale is a 53 year old female who is seen as self referral for   Chief Complaint   Patient presents with     Right Leg - Pain       Injury: Patient describes injury from running. Has been working with Hesham Rossi for this problem.        Location of Pain: right hip/upper leg anterior  and lateral, nonradiating, she does have mild low back pain intermittently which is worse with sitting, and does not always correlate with the upper leg pain.    Pain is primarily in the proximal lateral leg sometimes radiating anteriorly, and down to the knee.      Duration of Pain: 6 month(s)  Rating of Pain at worst: 8/10  Rating of Pain Currently: 1/10  Pain is better with: activity avoidance   Pain is worse with: running  Treatment so far consists of: chiropractic care, ART, BFR, massage  Associated symptoms: muscle spasms, numbness in left big toe   Recent imaging completed: No recent imaging completed.  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       Past Medical History:   Diagnosis Date     Anemia B twelve deficiency      Hypothyroid        Patient Active Problem List    Diagnosis Date Noted     Somatic dysfunction of pelvis region 08/20/2017     Priority: Medium     Hip pain, right 08/20/2017     Priority: Medium     Myalgia 08/20/2017     Priority: Medium     Enthesopathy of hip 09/13/2010     Priority: Medium       Family History   Problem Relation Age of Onset     Diabetes Father      Hypertension Father      Arthritis Mother      Cancer Mother      Heart Disease Unknown      Cerebrovascular Disease Unknown      Respiratory Unknown        Social History     Socioeconomic History     Marital status:      Spouse name: Not on file     Number of children: Not on file     Years of education: Not on file      "Highest education level: Not on file   Occupational History     Not on file   Social Needs     Financial resource strain: Not on file     Food insecurity:     Worry: Not on file     Inability: Not on file     Transportation needs:     Medical: Not on file     Non-medical: Not on file   Tobacco Use     Smoking status: Never Smoker   Substance and Sexual Activity     Alcohol use: Yes     Comment: rarely     Drug use: No       Past Surgical History:   Procedure Laterality Date     C STOMACH SURGERY PROCEDURE UNLISTED  2010    gallbladder removal             Review of Systems   Musculoskeletal: Positive for back pain and joint pain.   All other systems reviewed and are negative.        Physical Exam  /72   Ht 1.74 m (5' 8.5\")   Wt 63.5 kg (140 lb)   BMI 20.98 kg/m    Constitutional:well-developed, well-nourished, and in no distress.   Cardiovascular: Intact distal pulses.    Neurological: alert. Gait Normal:   Gait, station, stance, and balance appear normal for age  Skin: Skin is warm and dry.   Psychiatric: Mood and affect normal.   Respiratory: unlabored, speaks in full sentences  Lymph: no LAD, no lymphangitis    Posture lists slightly to the left.  No pain with hopping on the right.        Right Hip Exam     Tenderness   The patient is experiencing no tenderness.     Range of Motion   Abduction: normal   Adduction: normal   Extension: normal   Flexion: normal   External rotation: 40   Internal rotation: 25     Muscle Strength   Abduction: 5/5   Adduction: 5/5   Flexion: 5/5     Tests   DAVID: negative    Other   Erythema: absent  Scars: absent  Sensation: normal  Pulse: present    Comments:  Pain reproduced with internal and external rotation as well as straight leg raise/flexion.  No pain with heel percussion or logrolling      Back Exam     Tenderness   The patient is experiencing tenderness in the sacroiliac (Right SI area tender only).    Range of Motion   Extension: normal   Flexion: normal Back " flexion: Subtle elevation of left hemithorax with toe-touch.   Lateral bend right: normal   Lateral bend left: normal   Rotation right: normal   Rotation left: normal     Tests   Straight leg raise right: negative  Straight leg raise left: negative    Other   Toe walk: normal  Heel walk: normal  Gait: normal           X-ray images Ordered and independently reviewed by me in the office today with the patient. X-ray shows:     Recent Results (from the past 744 hour(s))   XR Lumbar Spine 2/3 Views    Narrative    XR LUMBAR SPINE 2-3 VIEWS  7/19/2019 4:23 PM     HISTORY: Acute right-sided low back pain with right-sided sciatica    COMPARISON: None.      Impression    IMPRESSION: There are 5 nonrib-bearing lumbar-type vertebral bodies.  Vertebral body heights are maintained. Alignment is normal. There is  evidence for mild facet arthropathy predominantly in the mid to lower  lumbar spine. There may be minimal disc space narrowing at L4-5.  Otherwise, the intervertebral disc spaces are normal. Surgical clips  noted in the right upper quadrant.    TERESA ARELLANO MD   XR Pelvis and Hip Right 1 View    Narrative    PELVIS AND RIGHT HIP ONE VIEW  7/19/2019 4:23 PM    HISTORY:  Acute pain of right hip    COMPARISON:  None.    FINDINGS:  No fracture or osseous lesion is seen. The joint spaces are  well preserved. An intrauterine device is seen within the mid pelvis.  No soft tissue pathology is seen.      Impression    IMPRESSION:  Unremarkable examination.    YOLANDE DOUGLAS MD       ASSESSMENT/PLAN    ICD-10-CM    1. Acute pain of right hip M25.551 XR Pelvis and Hip Right 1 View     MR Hip Right w/o Contrast   2. Acute right-sided low back pain with right-sided sciatica M54.41 XR Lumbar Spine 2/3 Views   3. Hip impingement syndrome, right M25.851 MR Hip Right w/o Contrast     Symptoms are poorly defined and differential diagnosis includes radiculopathy versus right hip pathology.  With facet arthropathy on x-ray, the low  back pain which is mild and intermittent seems to derive from this and may be unrelated to the hip.  Indeed, the hip itself shows a motion limitation consistent with a mild cam impingement syndrome.  On my review, the x-ray reflects cam impingement as well on the right.  No clear correlation on exam between back and leg symptoms therefore radiculopathy is deemed less likely.  We will proceed with an MRI of the hip and consider referral for therapy focused on hip impingement.

## 2019-07-26 ENCOUNTER — TELEPHONE (OUTPATIENT)
Dept: ORTHOPEDICS | Facility: CLINIC | Age: 54
End: 2019-07-26

## 2019-07-26 DIAGNOSIS — M54.41 ACUTE RIGHT-SIDED LOW BACK PAIN WITH RIGHT-SIDED SCIATICA: Primary | ICD-10-CM

## 2019-07-26 DIAGNOSIS — F41.8 SITUATIONAL ANXIETY: ICD-10-CM

## 2019-07-26 DIAGNOSIS — M47.816 LUMBAR FACET ARTHROPATHY: ICD-10-CM

## 2019-07-26 NOTE — TELEPHONE ENCOUNTER
Patient called requesting anti anxiety medication for MRI schedule on Monday evening.     She also expressed concerns that a hip MRI may not include the area that needs further exam, (ie back)      Upon huddle with GM; he would order a lumbar MR for patient if she would like.     He was more suspicious of hip patholgy more prominently which is why he ordered that initially.     Will wait for call back from patient.     Need to obtain pharmacy of where to send anxiety med for MRI.

## 2019-07-29 ENCOUNTER — HOSPITAL ENCOUNTER (OUTPATIENT)
Dept: MRI IMAGING | Facility: CLINIC | Age: 54
Discharge: HOME | End: 2019-07-29
Attending: FAMILY MEDICINE | Admitting: FAMILY MEDICINE
Payer: COMMERCIAL

## 2019-07-29 ENCOUNTER — HOSPITAL ENCOUNTER (OUTPATIENT)
Dept: MRI IMAGING | Facility: CLINIC | Age: 54
End: 2019-07-29
Attending: FAMILY MEDICINE
Payer: COMMERCIAL

## 2019-07-29 DIAGNOSIS — M47.816 LUMBAR FACET ARTHROPATHY: ICD-10-CM

## 2019-07-29 DIAGNOSIS — M25.851 HIP IMPINGEMENT SYNDROME, RIGHT: ICD-10-CM

## 2019-07-29 DIAGNOSIS — M54.41 ACUTE RIGHT-SIDED LOW BACK PAIN WITH RIGHT-SIDED SCIATICA: ICD-10-CM

## 2019-07-29 DIAGNOSIS — M25.551 ACUTE PAIN OF RIGHT HIP: ICD-10-CM

## 2019-07-29 PROCEDURE — 73721 MRI JNT OF LWR EXTRE W/O DYE: CPT | Mod: RT

## 2019-07-29 PROCEDURE — 72148 MRI LUMBAR SPINE W/O DYE: CPT

## 2019-07-29 RX ORDER — LORAZEPAM 2 MG/1
TABLET ORAL
Qty: 1 TABLET | Refills: 0 | Status: SHIPPED | OUTPATIENT
Start: 2019-07-29

## 2019-07-29 NOTE — TELEPHONE ENCOUNTER
Ok for lumbar MRI, order signed. [facility] as expected. Please notify

## 2019-07-29 NOTE — TELEPHONE ENCOUNTER
Patient called back. Pharmacy updated in [facility] to [facility] in [place] for the medication. She would like to get the Lumbar MRI and the Hip MRI if Dr. Abdi is agreeable all at once.     Lumbar MRI order started for your completion and signature.     Nithya Castano, ATC

## 2019-07-31 ENCOUNTER — TELEPHONE (OUTPATIENT)
Dept: ORTHOPEDICS | Facility: CLINIC | Age: 54
End: 2019-07-31

## 2019-07-31 DIAGNOSIS — M25.851 HIP IMPINGEMENT SYNDROME, RIGHT: Primary | ICD-10-CM

## 2019-07-31 DIAGNOSIS — M25.551 ACUTE PAIN OF RIGHT HIP: ICD-10-CM

## 2019-07-31 NOTE — TELEPHONE ENCOUNTER
Patient notified of MRI results.  Mild degenerative changes of the spine and some mild pubic symphysis edema which did not correlate to symptoms.  No specific cause for right leg pain.  Consider referral to [facility], will review with Hesham Rossi.

## 2019-07-31 NOTE — TELEPHONE ENCOUNTER
Patient returned call, and appreciative for MRI results and recommendations. She has scheduled therapy with [facility], and asks that if you call her if you think of any further recommendations.  She did also request copies of her MRI's to keep on hand.     Left voicemail for patient with Imaging number: [phone] to call to obtain MRI exams on a disc.     Asked that patient return call with any further questions.     Sarah Mejia, ATC

## 2019-08-08 ENCOUNTER — THERAPY VISIT (OUTPATIENT)
Dept: PHYSICAL THERAPY | Facility: CLINIC | Age: 54
End: 2019-08-08
Payer: COMMERCIAL

## 2019-08-08 DIAGNOSIS — M25.551 HIP PAIN, RIGHT: Primary | ICD-10-CM

## 2019-08-08 PROCEDURE — 97161 PT EVAL LOW COMPLEX 20 MIN: CPT | Mod: GP | Performed by: PHYSICAL THERAPIST

## 2019-08-08 PROCEDURE — 97110 THERAPEUTIC EXERCISES: CPT | Mod: GP | Performed by: PHYSICAL THERAPIST

## 2019-08-08 NOTE — PROGRESS NOTES
[facility] Initial Evaluation  Subjective:  C/C: Patient is a 54-year-old woman with complaints of right lateral leg spasm/jolt type pain.  Is aggravated intermittently with running.  Is unable to identify specific times when pain will occur.  Has become more frequent over time.  Patient finds that slower pace with running is especially aggravating.  Is relieved when not running.  Pain has gotten worse over the last several months.  Now patient will note similar symptoms with stair negotiation.  When pain occurs, patient will feel that she needs to jump, the leg will tend to collapse.  Has not fallen.  Has undergone treatment with massage therapist and Dr. Pierce.  Has also tried a bout of blood flow restriction therapy.  Has been unable to note significant relief.  Has noted some numbness in the left big toe.  Hx: 12/2018- patient started to note a worsening of symptoms with running outside.  Thought it was likely due to slippery terrain.  However, symptoms continue to worsen when weather improves.  When patient failed to respond to conservative treatment, was seen by Dr. Abdi who did MRIs to her hip and knee.  Patient states these MRIs were essentially normal.  PMH: Patient gives history of right hamstring, low back, Achilles problems on the right side.  Injuries were associated with repetitive microtrauma during running.  Has been able to manage with conservative treatment including ART, other soft tissue work, and exercise.  Has had to decrease running significantly.  Currently running 30 miles per week and is doing workouts in water as well.  General health: Excellent.  Patient likes to run long distances.  Has done ultra marathons.  Works as a .                          Objective:  Standing Alignment:        Lumbar:  Lordosis decr            Gait:  Lack thoracic rotation.                                                     Hip Evaluation  HIP AROM:      Extension: Left: 20     Right:  20              Hip PROM:    Flexion: Left: 120   Right: 110    Abduction: Left: 45    Right: 45    Internal Rotation: Left: 25    Right: 25  External Rotation: Left: 45    Right: 45      Pain: No pain with PROM, IR in prone > on right.  Endfeel: Improved force with MMT on the right with stab of pelvis.      Hip Strength:    Flexion:   Left: 5/5   -  Pain:  Right: 5-/5   -  Pain:                    Extension:  Left: 5/5  -  Pain:Right: 4+/5    -  Pain:            Knee Flexion:  Left: 5/5   -  Pain:Right: 4+/5   -  Pain:  Knee Extension:  Left: 5/5   -  Pain:Right: 5/5    -  Pain:        Hip Special Testing:   Special tests hip not assessed: Hypo STJ, PTFJ on right.  Limited t-sp rot, L>R.    Left hip negative for the following special tests:  SLR  Right hip negative for the following special tests:  SLR      Functional Testing:  Functional test hip: Lacks pronation with SLS on right.                                                            Musculoskeletal:        Legs:      ROS    Assessment/Plan:    Patient is a 54 year old female with right side hip and leg complaints.    Patient has the following significant findings with corresponding treatment plan.                Diagnosis 1:  Right hip impingement, hip pain  Pain -  manual therapy, self management, education and home program  Decreased ROM/flexibility - manual therapy and therapeutic exercise  Decreased joint mobility - manual therapy and therapeutic exercise  Decreased strength - therapeutic exercise and therapeutic activities  Impaired gait - gait training  Impaired muscle performance - neuro re-education  Decreased function - therapeutic activities    Therapy Evaluation Codes:   1) History comprised of:   Personal factors that impact the plan of care:    Cumulative Therapy Evaluation is: Low complexity.    Previous and current functional limitations:  (See Goal Flow Sheet for this information)    Short term and Long term goals: (See Goal Flow  Sheet for this information)     Communication ability:  Patient appears to be able to clearly communicate and understand verbal and written communication and follow directions correctly.  Treatment Explanation - The following has been discussed with the patient:   RX ordered/plan of care  Anticipated outcomes  Possible risks and side effects  This patient would benefit from PT intervention to resume normal activities.   Rehab potential is excellent.    Frequency:  1 X week, once daily  Duration:  for 8 visits  Discharge Plan:  Achieve all LTG.  Independent in home treatment program.  Reach maximal therapeutic benefit.    Please refer to the daily flowsheet for treatment today, total treatment time and time spent performing 1:1 timed codes.

## 2019-08-08 NOTE — TELEPHONE ENCOUNTER
Received call from RAKEL Dhaliwal. Patient is scheduled for physical therapy today with [facility] at 2:30 pm and they do not have orders.   Per chart review, Dr. Abdi discussed with patient about possible referral for physical therapy and was going to discuss her case with Hesham Rsosi.     Please see orders pending and advise. (please confirm diagnoses)    RAJIV Cooper RN

## 2019-08-12 NOTE — PROGRESS NOTES
[facility] Initial Evaluation  Subjective:       Pertinent medical history includes:  Thyroid problems.  Medical allergies: other. Other medical allergies details: penicillin and sulfa.    Current medications:  Thyroid medication.   Primary job tasks include:  Computer work, prolonged sitting, driving and repetitive tasks.           Patient is a .                           Objective:  System    Physical Exam    General     ROS    Assessment/Plan:

## 2019-08-15 ENCOUNTER — THERAPY VISIT (OUTPATIENT)
Dept: PHYSICAL THERAPY | Facility: CLINIC | Age: 54
End: 2019-08-15
Payer: COMMERCIAL

## 2019-08-15 DIAGNOSIS — M25.551 HIP PAIN, RIGHT: Primary | ICD-10-CM

## 2019-08-15 PROCEDURE — 97112 NEUROMUSCULAR REEDUCATION: CPT | Mod: GP | Performed by: PHYSICAL THERAPIST

## 2019-08-15 PROCEDURE — 97140 MANUAL THERAPY 1/> REGIONS: CPT | Mod: GP | Performed by: PHYSICAL THERAPIST

## 2019-08-26 ENCOUNTER — THERAPY VISIT (OUTPATIENT)
Dept: PHYSICAL THERAPY | Facility: CLINIC | Age: 54
End: 2019-08-26
Payer: COMMERCIAL

## 2019-08-26 DIAGNOSIS — M25.551 HIP PAIN, RIGHT: Primary | ICD-10-CM

## 2019-08-26 PROCEDURE — 97140 MANUAL THERAPY 1/> REGIONS: CPT | Mod: GP | Performed by: PHYSICAL THERAPIST

## 2019-08-26 PROCEDURE — 97530 THERAPEUTIC ACTIVITIES: CPT | Mod: GP | Performed by: PHYSICAL THERAPIST

## 2019-09-05 ENCOUNTER — THERAPY VISIT (OUTPATIENT)
Dept: PHYSICAL THERAPY | Facility: CLINIC | Age: 54
End: 2019-09-05
Payer: COMMERCIAL

## 2019-09-05 DIAGNOSIS — M79.10 MYALGIA: ICD-10-CM

## 2019-09-05 DIAGNOSIS — M76.892 HAMSTRING TENDINITIS OF LEFT THIGH: Primary | ICD-10-CM

## 2019-09-05 PROCEDURE — 97140 MANUAL THERAPY 1/> REGIONS: CPT | Mod: GP | Performed by: PHYSICAL THERAPIST

## 2019-09-05 PROCEDURE — 97110 THERAPEUTIC EXERCISES: CPT | Mod: GP | Performed by: PHYSICAL THERAPIST

## 2019-09-13 ENCOUNTER — THERAPY VISIT (OUTPATIENT)
Dept: PHYSICAL THERAPY | Facility: CLINIC | Age: 54
End: 2019-09-13
Payer: COMMERCIAL

## 2019-09-13 DIAGNOSIS — M25.551 HIP PAIN, RIGHT: ICD-10-CM

## 2019-09-13 DIAGNOSIS — M76.892 HAMSTRING TENDINITIS OF LEFT THIGH: Primary | ICD-10-CM

## 2019-09-13 PROCEDURE — 97110 THERAPEUTIC EXERCISES: CPT | Mod: GP | Performed by: PHYSICAL THERAPIST

## 2019-09-13 PROCEDURE — 97112 NEUROMUSCULAR REEDUCATION: CPT | Mod: GP | Performed by: PHYSICAL THERAPIST

## 2019-09-13 PROCEDURE — 97140 MANUAL THERAPY 1/> REGIONS: CPT | Mod: GP | Performed by: PHYSICAL THERAPIST

## 2019-09-20 ENCOUNTER — THERAPY VISIT (OUTPATIENT)
Dept: PHYSICAL THERAPY | Facility: CLINIC | Age: 54
End: 2019-09-20
Payer: COMMERCIAL

## 2019-09-20 DIAGNOSIS — M76.892 HAMSTRING TENDINITIS OF LEFT THIGH: Primary | ICD-10-CM

## 2019-09-20 DIAGNOSIS — M25.551 HIP PAIN, RIGHT: ICD-10-CM

## 2019-09-20 PROCEDURE — 97140 MANUAL THERAPY 1/> REGIONS: CPT | Mod: GP | Performed by: PHYSICAL THERAPIST

## 2019-09-20 PROCEDURE — 97112 NEUROMUSCULAR REEDUCATION: CPT | Mod: GP | Performed by: PHYSICAL THERAPIST

## 2019-09-20 PROCEDURE — 97110 THERAPEUTIC EXERCISES: CPT | Mod: GP | Performed by: PHYSICAL THERAPIST

## 2019-09-27 ENCOUNTER — THERAPY VISIT (OUTPATIENT)
Dept: PHYSICAL THERAPY | Facility: CLINIC | Age: 54
End: 2019-09-27
Payer: COMMERCIAL

## 2019-09-27 DIAGNOSIS — M76.892 HAMSTRING TENDINITIS OF LEFT THIGH: Primary | ICD-10-CM

## 2019-09-27 DIAGNOSIS — M25.551 HIP PAIN, RIGHT: ICD-10-CM

## 2019-09-27 PROCEDURE — 97140 MANUAL THERAPY 1/> REGIONS: CPT | Mod: GP | Performed by: PHYSICAL THERAPIST

## 2019-09-27 PROCEDURE — 97110 THERAPEUTIC EXERCISES: CPT | Mod: GP | Performed by: PHYSICAL THERAPIST

## 2019-10-10 ENCOUNTER — THERAPY VISIT (OUTPATIENT)
Dept: PHYSICAL THERAPY | Facility: CLINIC | Age: 54
End: 2019-10-10
Payer: COMMERCIAL

## 2019-10-10 DIAGNOSIS — M76.892 HAMSTRING TENDINITIS OF LEFT THIGH: Primary | ICD-10-CM

## 2019-10-10 DIAGNOSIS — M25.551 HIP PAIN, RIGHT: ICD-10-CM

## 2019-10-10 DIAGNOSIS — M79.10 MYALGIA: ICD-10-CM

## 2019-10-10 PROCEDURE — 97140 MANUAL THERAPY 1/> REGIONS: CPT | Mod: GP | Performed by: PHYSICAL THERAPIST

## 2019-10-10 PROCEDURE — 97110 THERAPEUTIC EXERCISES: CPT | Mod: GP | Performed by: PHYSICAL THERAPIST

## 2019-10-10 NOTE — PROGRESS NOTES
Subjective:  HPI                    Objective:  System    Physical Exam    General     ROS    Assessment/Plan:    PROGRESS  REPORT    Progress reporting period is from 8/10/19 to 10/10/19.       SUBJECTIVE  Subjective changes noted by patient:  .  Subjective: Patient reports that she was able to run the [place] 10 mile race felt as though she had raced the marathon due to fatigue and sensation in right leg.  Noted a mild irritation of right lateral thigh pain.  Notes some irritation on the dorsum of foot.  Continues to be able to modify symptoms with current exercise program.    Current pain level is   .     Previous pain level was   Initial Pain level: 2/10(More painful twinges).   Changes in function:  Yes (See Goal flowsheet attached for changes in current functional level)  Adverse reaction to treatment or activity: None    OBJECTIVE  Changes noted in objective findings:    Objective: Patient's gait appears slightly guarded.  Has maintained good mobility of the midfoot and subtalar joint.  Right leg position is standing continues to be improved.  Noted main restriction today in Cubonavicular medial cuneiform joint, first ray, and first MTP.  Improvement in patient's ability to mobilize first ray post mobilizations.     ASSESSMENT/PLAN  Updated problem list and treatment plan: Diagnosis 1:  Right hip impingement/leg pain  Pain -  manual therapy, self management, education and home program  Decreased ROM/flexibility - manual therapy and therapeutic exercise  Decreased joint mobility - manual therapy and therapeutic exercise  Decreased strength - therapeutic exercise and therapeutic activities  Impaired gait - gait training  Impaired muscle performance - neuro re-education  Decreased function - therapeutic activities  STG/LTGs have been met or progress has been made towards goals:  Yes (See Goal flow sheet completed today.)  Assessment of Progress: The patient's condition is improving.  Self Management Plans:   Patient is independent in a home treatment program.  Patient is independent in self management of symptoms.    Ale continues to require the following intervention to meet STG and LTG's:  PT    Recommendations:  This patient would benefit from continued therapy.     Frequency:  1 X week, once daily  Duration:  for 5-7 visits        Please refer to the daily flowsheet for treatment today, total treatment time and time spent performing 1:1 timed codes.

## 2019-10-14 ENCOUNTER — THERAPY VISIT (OUTPATIENT)
Dept: PHYSICAL THERAPY | Facility: CLINIC | Age: 54
End: 2019-10-14
Payer: COMMERCIAL

## 2019-10-14 DIAGNOSIS — M25.551 HIP PAIN, RIGHT: ICD-10-CM

## 2019-10-14 DIAGNOSIS — M76.892 HAMSTRING TENDINITIS OF LEFT THIGH: Primary | ICD-10-CM

## 2019-10-14 PROCEDURE — 97140 MANUAL THERAPY 1/> REGIONS: CPT | Mod: GP | Performed by: PHYSICAL THERAPIST

## 2019-10-14 PROCEDURE — 97110 THERAPEUTIC EXERCISES: CPT | Mod: GP | Performed by: PHYSICAL THERAPIST

## 2019-10-21 ENCOUNTER — THERAPY VISIT (OUTPATIENT)
Dept: PHYSICAL THERAPY | Facility: CLINIC | Age: 54
End: 2019-10-21
Payer: COMMERCIAL

## 2019-10-21 DIAGNOSIS — M25.551 HIP PAIN, RIGHT: ICD-10-CM

## 2019-10-21 DIAGNOSIS — M79.10 MYALGIA: ICD-10-CM

## 2019-10-21 DIAGNOSIS — M76.892 HAMSTRING TENDINITIS OF LEFT THIGH: Primary | ICD-10-CM

## 2019-10-21 PROCEDURE — 97140 MANUAL THERAPY 1/> REGIONS: CPT | Mod: GP | Performed by: PHYSICAL THERAPIST

## 2019-10-21 PROCEDURE — 97110 THERAPEUTIC EXERCISES: CPT | Mod: GP | Performed by: PHYSICAL THERAPIST

## 2019-10-21 PROCEDURE — 97112 NEUROMUSCULAR REEDUCATION: CPT | Mod: GP | Performed by: PHYSICAL THERAPIST

## 2019-11-13 ENCOUNTER — THERAPY VISIT (OUTPATIENT)
Dept: CHIROPRACTIC MEDICINE | Facility: CLINIC | Age: 54
End: 2019-11-13
Payer: COMMERCIAL

## 2019-11-13 DIAGNOSIS — M25.551 HIP PAIN, RIGHT: ICD-10-CM

## 2019-11-13 DIAGNOSIS — M79.10 MYALGIA: ICD-10-CM

## 2019-11-13 DIAGNOSIS — M99.05 SOMATIC DYSFUNCTION OF PELVIS REGION: Primary | ICD-10-CM

## 2019-11-13 PROCEDURE — 97110 THERAPEUTIC EXERCISES: CPT | Performed by: CHIROPRACTOR

## 2019-11-13 PROCEDURE — 98940 CHIROPRACT MANJ 1-2 REGIONS: CPT | Mod: AT | Performed by: CHIROPRACTOR

## 2019-11-13 NOTE — PROGRESS NOTES
Subjective:  CC: R lateral leg, B hips  Goal: Speed work without minimal discomfort   Denies any radiating pain. Denies any new changes in health history. Notes she is trying to take more time to handle stress and that is helping. Pleased with progress.     Comes in today doing well. Since last session has been working with PT and it has gone very well. She has completed multiple races since last session including 100 miler. Notes her right lateral leg is responding and she is feeling much more comfortable it. She reports today to have work done on B hips. She has has a couple massages which have helped, but she notes she has always responded well with treatment we do. She denies any new issues.     Objective:  Inspection:  mild thickening over right achilles   No Scars  Normal gait    Palpation:  Palpable soreness over R IT, R BF, R VL, R TFL, general lower back and B hip s  Myofascitis 2/4 noted over R IT, R BF, R hip ER's, LPS, B glute med    ROM:  Hip IR limited on left 5 degrees compared to right  Ankle DF limited on right with no pain  Back extension 30/30 with mild lower back pain  Hip adduction full and pain free  SLR symmetric  tightness noted over R hamstring   Knee flexion full with lateral knee pain     MMT:  Left glute med 4/5 with no pain  Right glute med 4/5 with no pain  TFL 4/5 B with lateral hip discomfort  Heel raise 5/5 with no pain   Hamstrings 5/5 B with no pain     MET:  Left out flare    NAL:  Restricted SI on right    Ortho: SLR (tightness only), sloan and armandoer (-)    Assessment:  NAL with associated myofascitis and weakness   Hip pain     Plan:   Patient tolerated treatment well today  Treatment Time: 45 minutes  75298 manipulation 1-2 segments: MET  33459 manipulation: ankle LAD  97536 Manual therapy: (ART, Graston, Strain Counter Strain, Fascial Manipulation, Cupping) performed over area of bilateral hamstrings, R calf, R lateral quad, B TFL, R hamstring, B glute med  93105 therapeutic  exercise (20 minutes):   1. Calf stretching for gastroc and soleus  2. Self plantar stretching  3. Single leg balance on [place] cushion  4. TFL stretching  5. Lateral hamstring stretching  6. Standing TFL stretching  7. side lying stretching over stability ball, piriformis stretching, marching bridges with ball squeeze   8.  straight leg bridges on stability ball, active isolated hamstring stretches, seated piriformis stretch  9.  only worked lower extremity stretching over quads, hamstrings, TFL, psoas.   10.  hip out flare exercises: left hip abduction, standing running pose with abduction, bridge with abduction, self active isolated stretching. We did review single leg step back and added some insight to make sure she feels it in her glute. Also review current PT exercises   11. she is seeing PT. Today we focused on hip stretching (TFL, hip ER). Condensed exercises down as she notes she is spending 1 hour a day doing them. We focused on stretching and hip extension exercises for glute strength. Added eccentric calfs   12.  single leg balance with opposite arm row, walking lunge with heel raise for balance, single leg paw drill, single leg bridge, figure 4 stretch  single leg bride, clam shell, side lying abduction, lateral band walks, cat / cow stretching, running RDL  13. L protocol for hamstring  Today: review of current PT exercises   US: 5 minutes over R distal lateral hamstring (did not do)  Shock wave: 15/8 over lateral hamstring (did not do)  Strapping:  Hip IR   Next visit: PRN as does well with active care program

## 2019-11-14 ENCOUNTER — THERAPY VISIT (OUTPATIENT)
Dept: PHYSICAL THERAPY | Facility: CLINIC | Age: 54
End: 2019-11-14
Payer: COMMERCIAL

## 2019-11-14 DIAGNOSIS — M79.10 MYALGIA: ICD-10-CM

## 2019-11-14 DIAGNOSIS — M76.892 HAMSTRING TENDINITIS OF LEFT THIGH: Primary | ICD-10-CM

## 2019-11-14 DIAGNOSIS — M25.551 HIP PAIN, RIGHT: ICD-10-CM

## 2019-11-14 PROCEDURE — 97140 MANUAL THERAPY 1/> REGIONS: CPT | Mod: GP | Performed by: PHYSICAL THERAPIST

## 2019-11-14 PROCEDURE — 97110 THERAPEUTIC EXERCISES: CPT | Mod: GP | Performed by: PHYSICAL THERAPIST

## 2019-11-22 ENCOUNTER — THERAPY VISIT (OUTPATIENT)
Dept: PHYSICAL THERAPY | Facility: CLINIC | Age: 54
End: 2019-11-22
Payer: COMMERCIAL

## 2019-11-22 DIAGNOSIS — M76.892 HAMSTRING TENDINITIS OF LEFT THIGH: Primary | ICD-10-CM

## 2019-11-22 DIAGNOSIS — M25.551 HIP PAIN, RIGHT: ICD-10-CM

## 2019-11-22 DIAGNOSIS — M99.05 SOMATIC DYSFUNCTION OF PELVIS REGION: ICD-10-CM

## 2019-11-22 DIAGNOSIS — M79.10 MYALGIA: ICD-10-CM

## 2019-11-22 PROCEDURE — 97112 NEUROMUSCULAR REEDUCATION: CPT | Mod: GP | Performed by: PHYSICAL THERAPIST

## 2019-11-22 PROCEDURE — 97140 MANUAL THERAPY 1/> REGIONS: CPT | Mod: GP | Performed by: PHYSICAL THERAPIST

## 2019-11-25 ENCOUNTER — THERAPY VISIT (OUTPATIENT)
Dept: CHIROPRACTIC MEDICINE | Facility: CLINIC | Age: 54
End: 2019-11-25
Payer: COMMERCIAL

## 2019-11-25 DIAGNOSIS — M25.551 HIP PAIN, RIGHT: ICD-10-CM

## 2019-11-25 DIAGNOSIS — M79.10 MYALGIA: ICD-10-CM

## 2019-11-25 DIAGNOSIS — M99.05 SOMATIC DYSFUNCTION OF PELVIS REGION: Primary | ICD-10-CM

## 2019-11-25 PROCEDURE — 97110 THERAPEUTIC EXERCISES: CPT | Performed by: CHIROPRACTOR

## 2019-11-25 PROCEDURE — 98940 CHIROPRACT MANJ 1-2 REGIONS: CPT | Mod: AT | Performed by: CHIROPRACTOR

## 2019-11-25 NOTE — PROGRESS NOTES
Subjective:  CC: R lateral leg, B hips  Goal: Speed work without minimal discomfort    Comes in today doing OK. Notes that hips are feeling good. Felt very good after last session. Notes felt like her running was more smooth. She notes tolerated session very well and was not sore. Denies any new issues.     Objective:  Inspection:  mild thickening over right achilles   No Scars  Normal gait    Palpation:  Palpable soreness over R IT, R BF, R VL, R TFL, general lower back and B hip s  Myofascitis 2/4 noted over R IT, R BF, R hip ER's, LPS, B glute med    ROM:  Hip IR limited on left 5 degrees compared to right  Ankle DF limited on right with no pain  Back extension 30/30 with mild lower back pain  Hip adduction full and pain free  SLR symmetric  tightness noted over R hamstring   Knee flexion full with lateral knee pain     MMT:  Left glute med 4/5 with no pain  Right glute med 4/5 with no pain  TFL 4/5 B with lateral hip discomfort  Heel raise 5/5 with no pain   Hamstrings 5/5 B with no pain     MET:  Left out flare    NAL:  Restricted SI on right    Ortho: SLR (tightness only), sloan and ashley (-)    Assessment:  NAL with associated myofascitis and weakness   Hip pain     Plan:   Patient tolerated treatment well today  Treatment Time: 45 minutes  15661 manipulation 1-2 segments: MET  50467 manipulation: ankle LAD  73637 Manual therapy: (ART, Graston, Strain Counter Strain, Fascial Manipulation, Cupping) performed over area of bilateral hamstrings, R calf, R lateral quad, B TFL, R hamstring, B glute med  78380 therapeutic exercise (20 minutes):   1. Calf stretching for gastroc and soleus  2. Self plantar stretching  3. Single leg balance on [place] cushion  4. TFL stretching  5. Lateral hamstring stretching  6. Standing TFL stretching  7. side lying stretching over stability ball, piriformis stretching, marching bridges with ball squeeze   8.  straight leg bridges on stability ball, active isolated hamstring  stretches, seated piriformis stretch  9.  only worked lower extremity stretching over quads, hamstrings, TFL, psoas.   10.  hip out flare exercises: left hip abduction, standing running pose with abduction, bridge with abduction, self active isolated stretching. We did review single leg step back and added some insight to make sure she feels it in her glute. Also review current PT exercises   11. she is seeing PT. Today we focused on hip stretching (TFL, hip ER). Condensed exercises down as she notes she is spending 1 hour a day doing them. We focused on stretching and hip extension exercises for glute strength. Added eccentric calfs   12.  single leg balance with opposite arm row, walking lunge with heel raise for balance, single leg paw drill, single leg bridge, figure 4 stretch  single leg bride, clam shell, side lying abduction, lateral band walks, cat / cow stretching, running RDL  13. L protocol for hamstring  Today: review of current PT exercises   US: 5 minutes over R distal lateral hamstring (did not do)  Shock wave: 15/8 over lateral hamstring (did not do)  Strapping:  Hip IR   Next visit: PRN as does well with active care program

## 2019-12-02 ENCOUNTER — THERAPY VISIT (OUTPATIENT)
Dept: PHYSICAL THERAPY | Facility: CLINIC | Age: 54
End: 2019-12-02
Payer: COMMERCIAL

## 2019-12-02 DIAGNOSIS — M76.892 HAMSTRING TENDINITIS OF LEFT THIGH: Primary | ICD-10-CM

## 2019-12-02 DIAGNOSIS — M25.551 HIP PAIN, RIGHT: ICD-10-CM

## 2019-12-02 DIAGNOSIS — M79.10 MYALGIA: ICD-10-CM

## 2019-12-02 PROCEDURE — 97110 THERAPEUTIC EXERCISES: CPT | Mod: GP | Performed by: PHYSICAL THERAPIST

## 2019-12-02 PROCEDURE — 97140 MANUAL THERAPY 1/> REGIONS: CPT | Mod: GP | Performed by: PHYSICAL THERAPIST

## 2019-12-09 ENCOUNTER — THERAPY VISIT (OUTPATIENT)
Dept: CHIROPRACTIC MEDICINE | Facility: CLINIC | Age: 54
End: 2019-12-09
Payer: COMMERCIAL

## 2019-12-09 DIAGNOSIS — M79.10 MYALGIA: ICD-10-CM

## 2019-12-09 DIAGNOSIS — M25.551 HIP PAIN, RIGHT: ICD-10-CM

## 2019-12-09 DIAGNOSIS — M99.05 SOMATIC DYSFUNCTION OF PELVIS REGION: Primary | ICD-10-CM

## 2019-12-09 PROCEDURE — 97110 THERAPEUTIC EXERCISES: CPT | Performed by: CHIROPRACTOR

## 2019-12-09 PROCEDURE — 98940 CHIROPRACT MANJ 1-2 REGIONS: CPT | Mod: AT | Performed by: CHIROPRACTOR

## 2019-12-09 NOTE — PROGRESS NOTES
Subjective:  CC: R lateral leg, B hips  Goal: Speed work without minimal discomfort    Comes in today doing well. Notes that has been feeling very good. Still working out with PT and notes she is getting better every session. Notes her running is going well. Has similar symptoms but more tightness in left hip and R ankle. Denies any new issues. Pleased with progress and notes treatment is helpful.       Objective:  Inspection:  mild thickening over right achilles   No Scars  Normal gait    Palpation:  Palpable soreness over R IT, R BF, R VL, R TFL, general lower back and B hip   Myofascitis 2/4 noted over R IT, R BF, R hip ER's, LPS, B glute med    ROM:  Hip IR limited on left 5 degrees compared to right  Ankle DF limited on right with no pain  Back extension 30/30 with mild lower back pain  Hip adduction full and pain free  SLR symmetric  tightness noted over R hamstring   Knee flexion full with lateral knee pain     MMT:  Left glute med 4/5 with no pain  Right glute med 4/5 with no pain  TFL 4/5 B with lateral hip discomfort  Heel raise 5/5 with no pain   Hamstrings 5/5 B with no pain     MET:  Left out flare    NAL:  Restricted SI on right    Ortho: SLR (tightness only), sloan and armandoer (-)    Assessment:  NAL with associated myofascitis and weakness   Hip pain     Plan:   Patient tolerated treatment well today  Treatment Time: 45 minutes  96922 manipulation 1-2 segments: MET  94307 manipulation: ankle LAD  27168 Manual therapy: (ART, Graston, Strain Counter Strain, Fascial Manipulation, Cupping) performed over area of bilateral hamstrings, R calf, R lateral quad, B TFL, R hamstring, B glute med  59645 therapeutic exercise (20 minutes):   1. Calf stretching for gastroc and soleus  2. Self plantar stretching  3. Single leg balance on [place] cushion  4. TFL stretching  5. Lateral hamstring stretching  6. Standing TFL stretching  7. side lying stretching over stability ball, piriformis stretching, marching bridges  with ball squeeze   8.  straight leg bridges on stability ball, active isolated hamstring stretches, seated piriformis stretch  9.  only worked lower extremity stretching over quads, hamstrings, TFL, psoas.   10.  hip out flare exercises: left hip abduction, standing running pose with abduction, bridge with abduction, self active isolated stretching. We did review single leg step back and added some insight to make sure she feels it in her glute. Also review current PT exercises   11. she is seeing PT. Today we focused on hip stretching (TFL, hip ER). Condensed exercises down as she notes she is spending 1 hour a day doing them. We focused on stretching and hip extension exercises for glute strength. Added eccentric calfs   12.  single leg balance with opposite arm row, walking lunge with heel raise for balance, single leg paw drill, single leg bridge, figure 4 stretch  single leg bride, clam shell, side lying abduction, lateral band walks, cat / cow stretching, running RDL  13. L protocol for hamstring  Today: review of current PT exercises   US: 5 minutes over R distal lateral hamstring (did not do)  Shock wave: 15/8 over lateral hamstring (did not do)  Strapping:  Hip IR   Next visit: PRN as does well with active care program

## 2019-12-16 ENCOUNTER — THERAPY VISIT (OUTPATIENT)
Dept: PHYSICAL THERAPY | Facility: CLINIC | Age: 54
End: 2019-12-16
Payer: COMMERCIAL

## 2019-12-16 DIAGNOSIS — M79.10 MYALGIA: ICD-10-CM

## 2019-12-16 DIAGNOSIS — M99.05 SOMATIC DYSFUNCTION OF PELVIS REGION: ICD-10-CM

## 2019-12-16 DIAGNOSIS — M76.892 HAMSTRING TENDINITIS OF LEFT THIGH: Primary | ICD-10-CM

## 2019-12-16 PROCEDURE — 97112 NEUROMUSCULAR REEDUCATION: CPT | Mod: GP | Performed by: PHYSICAL THERAPIST

## 2019-12-16 PROCEDURE — 97140 MANUAL THERAPY 1/> REGIONS: CPT | Mod: GP | Performed by: PHYSICAL THERAPIST

## 2019-12-23 ENCOUNTER — THERAPY VISIT (OUTPATIENT)
Dept: CHIROPRACTIC MEDICINE | Facility: CLINIC | Age: 54
End: 2019-12-23
Payer: COMMERCIAL

## 2019-12-23 DIAGNOSIS — M99.05 SOMATIC DYSFUNCTION OF PELVIS REGION: Primary | ICD-10-CM

## 2019-12-23 DIAGNOSIS — M25.551 HIP PAIN, RIGHT: ICD-10-CM

## 2019-12-23 DIAGNOSIS — M79.10 MYALGIA: ICD-10-CM

## 2019-12-23 PROCEDURE — 98940 CHIROPRACT MANJ 1-2 REGIONS: CPT | Mod: AT | Performed by: CHIROPRACTOR

## 2019-12-23 PROCEDURE — 97110 THERAPEUTIC EXERCISES: CPT | Performed by: CHIROPRACTOR

## 2019-12-23 NOTE — PROGRESS NOTES
Subjective:  CC: R lateral leg, B hips  Goal: Speed work without minimal discomfort    Comes in today doing well. Notes overall doing well. Notes R achilles is little achy. Notes that only sore last couple days from the winter running. Notes hip is tight. Running full go. Feel tentative with snow running and tightness along hip area. States PT is going very well and continues to note improvement.       Objective:  Inspection:  mild thickening over right achilles   No Scars  Normal gait    Palpation:  Palpable soreness over R IT, R BF, R VL, R TFL, general lower back and B hip   Myofascitis 2/4 noted over R IT, R BF, R hip ER's, LPS, B glute med    ROM:  Hip IR limited on left 5 degrees compared to right  Ankle DF limited on right with no pain  Back extension 30/30 with mild lower back pain  Hip adduction full and pain free  SLR symmetric  tightness noted over R hamstring   Knee flexion full with lateral knee pain     MMT:  Left glute med 4/5 with no pain  Right glute med 4/5 with no pain  TFL 4/5 B with lateral hip discomfort  Heel raise 5/5 with no pain   Hamstrings 5/5 B with no pain     MET:  Left out flare    NAL:  Restricted SI on right    Ortho: SLR (tightness only), sloan and ashley (-)    Assessment:  NAL with associated myofascitis and weakness   Hip pain     Plan:   Patient tolerated treatment well today  Treatment Time: 45 minutes  18311 manipulation 1-2 segments: MET  23736 manipulation: ankle LAD  64762 Manual therapy: (ART, Graston, Strain Counter Strain, Fascial Manipulation, Cupping) performed over area of bilateral hamstrings, R calf, R lateral quad, B TFL, R hamstring, B glute med  98667 therapeutic exercise (20 minutes):   1. Calf stretching for gastroc and soleus  2. Self plantar stretching  3. Single leg balance on [place] cushion  4. TFL stretching  5. Lateral hamstring stretching  6. Standing TFL stretching  7. side lying stretching over stability ball, piriformis stretching, marching bridges  with ball squeeze   8.  straight leg bridges on stability ball, active isolated hamstring stretches, seated piriformis stretch  9.  only worked lower extremity stretching over quads, hamstrings, TFL, psoas.   10.  hip out flare exercises: left hip abduction, standing running pose with abduction, bridge with abduction, self active isolated stretching. We did review single leg step back and added some insight to make sure she feels it in her glute. Also review current PT exercises   11. she is seeing PT. Today we focused on hip stretching (TFL, hip ER). Condensed exercises down as she notes she is spending 1 hour a day doing them. We focused on stretching and hip extension exercises for glute strength. Added eccentric calfs   12.  single leg balance with opposite arm row, walking lunge with heel raise for balance, single leg paw drill, single leg bridge, figure 4 stretch  single leg bride, clam shell, side lying abduction, lateral band walks, cat / cow stretching, running RDL  13. L protocol for hamstring  Today: review of current PT exercises   US: 5 minutes over R distal lateral hamstring (did not do)  Shock wave: 15/8 over lateral hamstring (did not do)  Strapping:  Hip IR   Next visit: PRN as does well with active care program

## 2020-01-07 ENCOUNTER — THERAPY VISIT (OUTPATIENT)
Dept: PHYSICAL THERAPY | Facility: CLINIC | Age: 55
End: 2020-01-07
Payer: COMMERCIAL

## 2020-01-07 DIAGNOSIS — M99.05 SOMATIC DYSFUNCTION OF PELVIS REGION: ICD-10-CM

## 2020-01-07 DIAGNOSIS — M76.892 HAMSTRING TENDINITIS OF LEFT THIGH: Primary | ICD-10-CM

## 2020-01-07 DIAGNOSIS — M79.10 MYALGIA: ICD-10-CM

## 2020-01-07 DIAGNOSIS — M25.551 HIP PAIN, RIGHT: ICD-10-CM

## 2020-01-07 PROCEDURE — 97140 MANUAL THERAPY 1/> REGIONS: CPT | Mod: GP | Performed by: PHYSICAL THERAPIST

## 2020-01-07 PROCEDURE — 97112 NEUROMUSCULAR REEDUCATION: CPT | Mod: GP | Performed by: PHYSICAL THERAPIST
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
## 2020-01-21 ENCOUNTER — THERAPY VISIT (OUTPATIENT)
Dept: PHYSICAL THERAPY | Facility: CLINIC | Age: 55
End: 2020-01-21
Payer: COMMERCIAL

## 2020-01-21 DIAGNOSIS — M76.892 HAMSTRING TENDINITIS OF LEFT THIGH: Primary | ICD-10-CM

## 2020-01-21 DIAGNOSIS — M99.05 SOMATIC DYSFUNCTION OF PELVIS REGION: ICD-10-CM

## 2020-01-21 DIAGNOSIS — M25.551 HIP PAIN, RIGHT: ICD-10-CM

## 2020-01-21 DIAGNOSIS — M79.10 MYALGIA: ICD-10-CM

## 2020-01-21 PROCEDURE — 97112 NEUROMUSCULAR REEDUCATION: CPT | Mod: GP | Performed by: PHYSICAL THERAPIST

## 2020-01-21 PROCEDURE — 97110 THERAPEUTIC EXERCISES: CPT | Mod: GP | Performed by: PHYSICAL THERAPIST

## 2020-01-29 ENCOUNTER — THERAPY VISIT (OUTPATIENT)
Dept: CHIROPRACTIC MEDICINE | Facility: CLINIC | Age: 55
End: 2020-01-29
Payer: COMMERCIAL

## 2020-01-29 DIAGNOSIS — M79.10 MYALGIA: ICD-10-CM

## 2020-01-29 DIAGNOSIS — M25.551 HIP PAIN, RIGHT: ICD-10-CM

## 2020-01-29 DIAGNOSIS — M99.05 SOMATIC DYSFUNCTION OF PELVIS REGION: Primary | ICD-10-CM

## 2020-01-29 PROCEDURE — 98940 CHIROPRACT MANJ 1-2 REGIONS: CPT | Mod: AT | Performed by: CHIROPRACTOR

## 2020-01-29 PROCEDURE — 97110 THERAPEUTIC EXERCISES: CPT | Performed by: CHIROPRACTOR

## 2020-01-31 NOTE — PROGRESS NOTES
Subjective:  CC: R lateral leg, B hips  Goal: Speed work without minimal discomfort    Comes in today doing well. Doing marathon in April. Did 1/2 last weekend and felt good. Keeping mileage lower still. Still working with PT and notes that is going well. Notes general leg tightness worse after running and when sitting for long periods. Achilles is feeling pretty good. Notes hamstring tightness B.       Objective:  Inspection:  mild thickening over right achilles   No Scars  Normal gait    Palpation:  Palpable soreness over R IT, R BF, R VL, R TFL, general lower back and B hip   Myofascitis 2/4 noted over R IT, R BF, R hip ER's, LPS, B glute med    ROM:  Hip IR limited on left 5 degrees compared to right  Ankle DF limited on right with no pain  Back extension 30/30 with mild lower back pain  Hip adduction full and pain free  SLR symmetric  tightness noted over R hamstring   Knee flexion full with lateral knee pain     MMT:  Left glute med 4/5 with no pain  Right glute med 4/5 with no pain  TFL 4/5 B with lateral hip discomfort  Heel raise 5/5 with no pain   Hamstrings 5/5 B with no pain     MET:  Left out flare    NAL:  Restricted SI on right    Ortho: SLR (tightness only), sloan and ashley (-)    Assessment:  NAL with associated myofascitis and weakness   Hip pain     Plan:   Patient tolerated treatment well today  Treatment Time: 45 minutes  92456 manipulation 1-2 segments: MET  35105 manipulation: ankle LAD  00573 Manual therapy: (ART, Graston, Strain Counter Strain, Fascial Manipulation, Cupping) performed over area of bilateral hamstrings, R calf, R lateral quad, B TFL, R hamstring, B glute med  73866 therapeutic exercise (20 minutes):   1. Calf stretching for gastroc and soleus  2. Self plantar stretching  3. Single leg balance on [place] cushion  4. TFL stretching  5. Lateral hamstring stretching  6. Standing TFL stretching  7. side lying stretching over stability ball, piriformis stretching, marching bridges  with ball squeeze   8.  straight leg bridges on stability ball, active isolated hamstring stretches, seated piriformis stretch  9.  only worked lower extremity stretching over quads, hamstrings, TFL, psoas.   10.  hip out flare exercises: left hip abduction, standing running pose with abduction, bridge with abduction, self active isolated stretching. We did review single leg step back and added some insight to make sure she feels it in her glute. Also review current PT exercises   11. she is seeing PT. Today we focused on hip stretching (TFL, hip ER). Condensed exercises down as she notes she is spending 1 hour a day doing them. We focused on stretching and hip extension exercises for glute strength. Added eccentric calfs   12.  single leg balance with opposite arm row, walking lunge with heel raise for balance, single leg paw drill, single leg bridge, figure 4 stretch  single leg bride, clam shell, side lying abduction, lateral band walks, cat / cow stretching, running RDL  13. L protocol for hamstring  Today: review of current PT exercises   US: 5 minutes over R distal lateral hamstring (did not do)  Shock wave: 15/8 over lateral hamstring (did not do)  Strapping:  Hip IR   Next visit: PRN as does well with active care program

## 2020-02-04 ENCOUNTER — THERAPY VISIT (OUTPATIENT)
Dept: PHYSICAL THERAPY | Facility: CLINIC | Age: 55
End: 2020-02-04
Payer: COMMERCIAL

## 2020-02-04 DIAGNOSIS — M76.892 HAMSTRING TENDINITIS OF LEFT THIGH: Primary | ICD-10-CM

## 2020-02-04 DIAGNOSIS — M99.05 SOMATIC DYSFUNCTION OF PELVIS REGION: ICD-10-CM

## 2020-02-04 DIAGNOSIS — M25.551 HIP PAIN, RIGHT: ICD-10-CM

## 2020-02-04 DIAGNOSIS — M79.10 MYALGIA: ICD-10-CM

## 2020-02-04 PROCEDURE — 97112 NEUROMUSCULAR REEDUCATION: CPT | Mod: GP | Performed by: PHYSICAL THERAPIST

## 2020-02-04 PROCEDURE — 97140 MANUAL THERAPY 1/> REGIONS: CPT | Mod: GP | Performed by: PHYSICAL THERAPIST

## 2020-02-18 ENCOUNTER — THERAPY VISIT (OUTPATIENT)
Dept: PHYSICAL THERAPY | Facility: CLINIC | Age: 55
End: 2020-02-18
Payer: COMMERCIAL

## 2020-02-18 DIAGNOSIS — M79.10 MYALGIA: ICD-10-CM

## 2020-02-18 DIAGNOSIS — M25.551 HIP PAIN, RIGHT: ICD-10-CM

## 2020-02-18 DIAGNOSIS — M76.892 HAMSTRING TENDINITIS OF LEFT THIGH: Primary | ICD-10-CM

## 2020-02-18 DIAGNOSIS — M99.05 SOMATIC DYSFUNCTION OF PELVIS REGION: ICD-10-CM

## 2020-02-18 PROCEDURE — 97112 NEUROMUSCULAR REEDUCATION: CPT | Mod: GP | Performed by: PHYSICAL THERAPIST

## 2020-02-18 PROCEDURE — 97140 MANUAL THERAPY 1/> REGIONS: CPT | Mod: GP | Performed by: PHYSICAL THERAPIST

## 2020-02-24 ENCOUNTER — THERAPY VISIT (OUTPATIENT)
Dept: PHYSICAL THERAPY | Facility: CLINIC | Age: 55
End: 2020-02-24
Payer: COMMERCIAL

## 2020-02-24 DIAGNOSIS — M76.892 HAMSTRING TENDINITIS OF LEFT THIGH: Primary | ICD-10-CM

## 2020-02-24 DIAGNOSIS — M99.05 SOMATIC DYSFUNCTION OF PELVIS REGION: ICD-10-CM

## 2020-02-24 DIAGNOSIS — M25.551 HIP PAIN, RIGHT: ICD-10-CM

## 2020-02-24 DIAGNOSIS — M79.10 MYALGIA: ICD-10-CM

## 2020-02-24 PROCEDURE — 97140 MANUAL THERAPY 1/> REGIONS: CPT | Mod: GP | Performed by: PHYSICAL THERAPIST

## 2020-02-24 PROCEDURE — 97110 THERAPEUTIC EXERCISES: CPT | Mod: GP | Performed by: PHYSICAL THERAPIST

## 2020-03-04 ENCOUNTER — THERAPY VISIT (OUTPATIENT)
Dept: CHIROPRACTIC MEDICINE | Facility: CLINIC | Age: 55
End: 2020-03-04
Payer: COMMERCIAL

## 2020-03-04 DIAGNOSIS — G89.29 CHRONIC PAIN OF RIGHT ANKLE: ICD-10-CM

## 2020-03-04 DIAGNOSIS — M25.551 HIP PAIN, RIGHT: ICD-10-CM

## 2020-03-04 DIAGNOSIS — M99.05 SOMATIC DYSFUNCTION OF PELVIS REGION: Primary | ICD-10-CM

## 2020-03-04 DIAGNOSIS — M79.10 MYALGIA: ICD-10-CM

## 2020-03-04 DIAGNOSIS — M25.571 CHRONIC PAIN OF RIGHT ANKLE: ICD-10-CM

## 2020-03-04 PROCEDURE — 98940 CHIROPRACT MANJ 1-2 REGIONS: CPT | Mod: AT | Performed by: CHIROPRACTOR

## 2020-03-04 PROCEDURE — 97110 THERAPEUTIC EXERCISES: CPT | Performed by: CHIROPRACTOR

## 2020-03-06 NOTE — PROGRESS NOTES
Subjective:  CC: R lateral leg, B hips  Goal: Speed work without minimal discomfort    Comes in today doing well. Doing marathon in April. Had felt good after last session and then have set back with hip and leg again. Ntoes that gave out like in past. Does not know what caused it besides stress in life coupled with a slow run in bad weather. Notes did see PT and that helped get her back on track. Notes back running and training. Notes stiffness in right ankle and that causes tightness along R hip and hamstring. Pain is 3/10 and worse after running. Notes still working on active care and PT exercises. Denies any radiating pain or other changes in health history. Pain is 3/10.       Objective:  Inspection:  mild thickening over right achilles   No Scars  Normal gait    Palpation:  Palpable soreness over R IT, R BF, R VL, R TFL, general lower back and B hip   Myofascitis 2/4 noted over R IT, R BF, R hip ER's, LPS, B glute med    ROM:  Hip IR limited on left 5 degrees compared to right  Ankle DF limited on right with no pain  Back extension 30/30 with mild lower back pain  Hip adduction full and pain free  SLR symmetric  tightness noted over R hamstring   Knee flexion full with lateral knee pain     MMT:  Left glute med 4/5 with no pain  Right glute med 4/5 with no pain  TFL 4/5 B with lateral hip discomfort  Heel raise 5/5 with no pain   Hamstrings 5/5 B with no pain     MET:  Left out flare    NAL:  Restricted SI on right    Ortho: SLR (tightness only), sloan and armandoer (-)    Assessment:  NAL with associated myofascitis and weakness   Hip pain     Plan:   Patient tolerated treatment well today  Treatment Time: 45 minutes  68259 manipulation 1-2 segments: MET  82995 manipulation: ankle LAD  47886 Manual therapy: (ART, Graston, Strain Counter Strain, Fascial Manipulation, Cupping) performed over area of bilateral hamstrings, R calf, R lateral quad, B TFL, R hamstring, B glute med  31909 therapeutic exercise (20  minutes):   1. Calf stretching for gastroc and soleus  2. Self plantar stretching  3. Single leg balance on [place] cushion  4. TFL stretching  5. Lateral hamstring stretching  6. Standing TFL stretching  7. side lying stretching over stability ball, piriformis stretching, marching bridges with ball squeeze   8.  straight leg bridges on stability ball, active isolated hamstring stretches, seated piriformis stretch  9.  only worked lower extremity stretching over quads, hamstrings, TFL, psoas.   10.  hip out flare exercises: left hip abduction, standing running pose with abduction, bridge with abduction, self active isolated stretching. We did review single leg step back and added some insight to make sure she feels it in her glute. Also review current PT exercises   11. she is seeing PT. Today we focused on hip stretching (TFL, hip ER). Condensed exercises down as she notes she is spending 1 hour a day doing them. We focused on stretching and hip extension exercises for glute strength. Added eccentric calfs   12.  single leg balance with opposite arm row, walking lunge with heel raise for balance, single leg paw drill, single leg bridge, figure 4 stretch  single leg bride, clam shell, side lying abduction, lateral band walks, cat / cow stretching, running RDL  13. L protocol for hamstring  Today: review of current PT exercises   US: 5 minutes over R distal lateral hamstring (did not do)  Shock wave: 15/8 over lateral hamstring (did not do)  Strapping:  Hip IR   Next visit: PRN as does well with active care program

## 2020-03-10 ENCOUNTER — THERAPY VISIT (OUTPATIENT)
Dept: PHYSICAL THERAPY | Facility: CLINIC | Age: 55
End: 2020-03-10
Payer: COMMERCIAL

## 2020-03-10 DIAGNOSIS — M76.892 HAMSTRING TENDINITIS OF LEFT THIGH: Primary | ICD-10-CM

## 2020-03-10 DIAGNOSIS — M79.10 MYALGIA: ICD-10-CM

## 2020-03-10 DIAGNOSIS — M99.05 SOMATIC DYSFUNCTION OF PELVIS REGION: ICD-10-CM

## 2020-03-10 PROCEDURE — 97530 THERAPEUTIC ACTIVITIES: CPT | Mod: GP | Performed by: PHYSICAL THERAPIST

## 2020-03-10 PROCEDURE — 97110 THERAPEUTIC EXERCISES: CPT | Mod: GP | Performed by: PHYSICAL THERAPIST

## 2020-03-10 PROCEDURE — 97140 MANUAL THERAPY 1/> REGIONS: CPT | Mod: GP | Performed by: PHYSICAL THERAPIST

## 2020-03-10 NOTE — PROGRESS NOTES
Subjective:  HPI  Physical Exam                    Objective:  System    Physical Exam    General     ROS    Assessment/Plan:    PROGRESS  REPORT    Progress reporting period is from 12/16/2020 to 3/10/2020.       SUBJECTIVE  Subjective changes noted by patient:  .  Subjective: Patient reports for treatment stating that she has noted pain in the lateral right heel over the last week.  Can give no specific trauma but will notice even at rest.  Is also suffered an episode of vestibular neuritis.  This occurred following her last treatment 2/24/2020.  Severe dizziness interrupted her ability to exercise and run.  Started to return and was tolerating okay.  Then was seen by Dr. Ching and noted some improvement in mobility.  Over the next several days started to note current symptoms of heel pain.    Current pain level is   .     Previous pain level was   Initial Pain level: 2/10(More painful twinges).   Changes in function:    Adverse reaction to treatment or activity: None    OBJECTIVE  Changes noted in objective findings:    Objective: Reassessed.  Foot and ankle rate exam was negative with the exception of pain with peroneals put on stretch.  Hypomobility of subtalar joint.  Tenderness where peroneal brevis and peroneal longus split.  Findings consistent with peroneal longus tenosynovitis.  Initiated treatment.  See flow sheet.     ASSESSMENT/PLAN  Updated problem list and treatment plan: Diagnosis 1:   Right hip impingement/leg pain    Pain -  manual therapy, self management, education and home program  Decreased ROM/flexibility - manual therapy and therapeutic exercise  Decreased joint mobility - manual therapy and therapeutic exercise  Decreased strength - therapeutic exercise and therapeutic activities  Impaired gait - gait training  Impaired muscle performance - neuro re-education  Decreased function - therapeutic activities  STG/LTGs have been met or progress has been made towards goals:  Yes (See Goal flow  sheet completed today.)  Assessment of Progress:   Self Management Plans:  Patient has been instructed in a home treatment program.    Ale continues to require the following intervention to meet STG and LTG's:  PT    Recommendations:  This patient would benefit from continued therapy.     Frequency:  1 X week, once daily  Duration:  for 5-7 visits        Please refer to the daily flowsheet for treatment today, total treatment time and time spent performing 1:1 timed codes.

## 2020-03-16 ENCOUNTER — THERAPY VISIT (OUTPATIENT)
Dept: CHIROPRACTIC MEDICINE | Facility: CLINIC | Age: 55
End: 2020-03-16
Payer: COMMERCIAL

## 2020-03-16 DIAGNOSIS — M25.571 PAIN IN JOINT, ANKLE AND FOOT, RIGHT: ICD-10-CM

## 2020-03-16 DIAGNOSIS — M99.05 SOMATIC DYSFUNCTION OF PELVIS REGION: Primary | ICD-10-CM

## 2020-03-16 DIAGNOSIS — M79.10 MYALGIA: ICD-10-CM

## 2020-03-16 DIAGNOSIS — M25.551 HIP PAIN, RIGHT: ICD-10-CM

## 2020-03-16 PROCEDURE — 97110 THERAPEUTIC EXERCISES: CPT | Performed by: CHIROPRACTOR

## 2020-03-16 PROCEDURE — 98940 CHIROPRACT MANJ 1-2 REGIONS: CPT | Mod: AT | Performed by: CHIROPRACTOR

## 2020-03-17 NOTE — PROGRESS NOTES
Subjective:  CC: R lateral leg, B hips  Goal: Speed work without minimal discomfort    Comes in today doing well. Notes saw PT Monday and notes put sticky strips on lateral ankle to help with peroneal tendon. Notes it is very helpful. Still has tightness lateral calf and hamstring. Denies any new issues. Notes is still running but marathon did get canceled. She has not have her leg give out and notes felt very good after last session.       Objective:  Inspection:  mild thickening over right achilles   No Scars  Normal gait    Palpation:  Palpable soreness over R IT, R BF, R VL, R lateral ankle   Myofascitis 2/4 noted over R IT, R BF, R hip ER's, R peroneal tendon    ROM:  Hip IR limited on left 5 degrees compared to right  Ankle DF limited on right with no pain  Ankle inversion full with tightness noted over right lateral ankle  Back extension 30/30 with mild lower back pain  Hip adduction full and pain free  SLR symmetric  tightness noted over R hamstring   Knee flexion full with lateral knee pain     MMT:  Left glute med 4/5 with no pain  Right glute med 4/5 with no pain  TFL 4/5 B with lateral hip discomfort  Heel raise 5/5 with no pain   Hamstrings 5/5 B with no pain   Ankle eversion 5/5 with mild pain on R    MET:  Left out flare    NAL:  Restricted SI on right    Ortho: SLR (tightness only), sloan and fader (-)    Assessment:  NAL with associated myofascitis and weakness   Hip pain   Ankle pain     Plan:   Patient tolerated treatment well today  Treatment Time: 45 minutes  61489 manipulation 1-2 segments: MET  08279 manipulation: ankle LAD  05373 Manual therapy: (ART, Graston, Strain Counter Strain, Fascial Manipulation, Cupping) performed over area of bilateral hamstrings, R calf, R lateral quad, B TFL, R hamstring, B glute med, R peroneal   09820 therapeutic exercise (20 minutes):   1. Calf stretching for gastroc and soleus  2. Self plantar stretching  3. Single leg balance on [place] cushion  4. TFL  stretching  5. Lateral hamstring stretching  6. Standing TFL stretching  7. side lying stretching over stability ball, piriformis stretching, marching bridges with ball squeeze   8.  straight leg bridges on stability ball, active isolated hamstring stretches, seated piriformis stretch  9.  only worked lower extremity stretching over quads, hamstrings, TFL, psoas.   10.  hip out flare exercises: left hip abduction, standing running pose with abduction, bridge with abduction, self active isolated stretching. We did review single leg step back and added some insight to make sure she feels it in her glute. Also review current PT exercises   11. she is seeing PT. Today we focused on hip stretching (TFL, hip ER). Condensed exercises down as she notes she is spending 1 hour a day doing them. We focused on stretching and hip extension exercises for glute strength. Added eccentric calfs   12.  single leg balance with opposite arm row, walking lunge with heel raise for balance, single leg paw drill, single leg bridge, figure 4 stretch  single leg bride, clam shell, side lying abduction, lateral band walks, cat / cow stretching, running RDL  13. L protocol for hamstring  Today: review of current PT exercises, self ankle motion   US: 5 minutes over R distal lateral hamstring (did not do)  Shock wave: 15/8 over lateral hamstring (did not do)  Strapping:  Hip IR   Next visit: PRN as does well with active care program

## 2020-04-17 ENCOUNTER — VIRTUAL VISIT (OUTPATIENT)
Dept: PHYSICAL THERAPY | Facility: CLINIC | Age: 55
End: 2020-04-17
Payer: COMMERCIAL

## 2020-04-17 DIAGNOSIS — M99.05 SOMATIC DYSFUNCTION OF PELVIS REGION: ICD-10-CM

## 2020-04-17 DIAGNOSIS — M25.551 HIP PAIN, RIGHT: ICD-10-CM

## 2020-04-17 DIAGNOSIS — M76.892 HAMSTRING TENDINITIS OF LEFT THIGH: Primary | ICD-10-CM

## 2020-04-17 DIAGNOSIS — M79.10 MYALGIA: ICD-10-CM

## 2020-04-17 PROCEDURE — 97110 THERAPEUTIC EXERCISES: CPT | Mod: GT | Performed by: PHYSICAL THERAPIST

## 2020-04-17 PROCEDURE — 97112 NEUROMUSCULAR REEDUCATION: CPT | Mod: GT | Performed by: PHYSICAL THERAPIST

## 2020-04-17 NOTE — PROGRESS NOTES
"Physical Therapy Virtual Follow Up Visit      The patient has been notified of following:     \"This virtual visit will be conducted between you and your provider. We have found that certain health care needs can be provided without the need for physical presence.  This service lets us provide the care you need with a virtual visit.\"    Due to external, as well as internal [facility] management of the COVID-19 Virus, Ale Rose was not seen in our clinic.  As a substitution, we implemented a virtual visit to manage this patient's condition utilizing the [employer] virtual visit platform via the patient s existing code.  The provider, Laney Altamirano, reviewed the patient's chart, PTRx prescription, and spoke with the patient to determine the following telemedicine visit is appropriate and effective for the patient's care.    The following type of visit was completed:   Video Visit:  The [employer] platform uses a synchronous HIPAA compliant video stream for this patient encounter.        S: Ale Rose is a 54 year old female. Connected virtually on the [employer] platform to discuss their condition/progress.  Good improvement in anterior foot pain.  Patient finds that she is still noting tightness and feelings of instability in the right thigh especially with longer runs.  However, symptoms are not as bad as they were when they were at their worst.  Feels that she has lost some ground due to not getting manual work and restrictions of COVID 19.       O: Patient demonstrated good maintenance of mobility of great toe.  Good response to treatment to tenosynovitis.  Static standing posture reveals good gains in position of right femur.  However, does appear to have lost some mobility of right midfoot.  Is able to move through good range of motion with helicopter exercise.  However, with just standing weightbearing, right midfoot appears less mobile and fluid than left.  Worked on improving mobility of right " foot and lower extremity and given exercises to address.  System  Physical Exam  General   ROS  PTRx Content from today's visit:  Exercise Name: Single Leg Squat at Plinth - Reps: [id number] opposite leg to rest on floor. - Sessions: 1-2, Notes: Do on left side 1st-go to school on left-then do right.  Exercise Name: Ankle Stabilization Standing Pronation/Supination, Sets: 1-2 - Reps: 10 - Sessions: 1-2, Notes: Do one leg at time.  Do left leg first-then right. Try to may right as fluid as left  Exercise Name: Side-lying Positional Traction  Exercise Name: Helicopter Kicks - Reps: x10 - Sessions: 2-3, Notes: Go to school on left leg.    A:   Patient is progressing as expected.flexibility   Response to therapy has shown an improvement in  pain level, and gait      P: Patient will continue with the exercise program assigned on their PTRx code and will add the following measures to manage their pain/condition: Continue to pay attention to symptoms and modify running schedule as indicated.    Current treatment program is being advanced to more complex exercises.      Virtual visit contact time    Time of service began: 11:20 AM  Time of service ended: 12:00 PM  Total Time for set up, visit, and documentation: 40 minutes    Payor: PREFERREDONE / Plan: PREFERREDONE HMO / Product Type: PPO /   .  Procedure Code/s   Therapeutic Exercise (40909): 15 minutes  Neuromuscular Re-education (58224): 25 minutes    I have reviewed the note as documented above.  This accurately captures the substance of my conversation with the patient.  Provider location: [place], [place] ([facility]State)  Patient location: Home

## 2020-05-07 ENCOUNTER — VIRTUAL VISIT (OUTPATIENT)
Dept: PHYSICAL THERAPY | Facility: CLINIC | Age: 55
End: 2020-05-07
Payer: COMMERCIAL

## 2020-05-07 DIAGNOSIS — M99.05 SOMATIC DYSFUNCTION OF PELVIS REGION: ICD-10-CM

## 2020-05-07 DIAGNOSIS — M76.892 HAMSTRING TENDINITIS OF LEFT THIGH: Primary | ICD-10-CM

## 2020-05-07 DIAGNOSIS — M25.551 HIP PAIN, RIGHT: ICD-10-CM

## 2020-05-07 DIAGNOSIS — M79.10 MYALGIA: ICD-10-CM

## 2020-05-07 PROCEDURE — 97112 NEUROMUSCULAR REEDUCATION: CPT | Mod: GT | Performed by: PHYSICAL THERAPIST

## 2020-05-07 PROCEDURE — 97110 THERAPEUTIC EXERCISES: CPT | Mod: GT | Performed by: PHYSICAL THERAPIST

## 2020-05-07 NOTE — PROGRESS NOTES
"Physical Therapy Virtual Follow Up Visit      The patient has been notified of following:     \"This virtual visit will be conducted between you and your provider. We have found that certain health care needs can be provided without the need for physical presence.  This service lets us provide the care you need with a virtual visit.\"    Due to external, as well as internal [facility] management of the COVID-19 Virus, Ale Rose was not seen in our clinic.  As a substitution, we implemented a virtual visit to manage this patient's condition utilizing the [employer]x virtual visit platform via the patient s existing code.  The provider, Laney Altamirano, reviewed the patient's chart, PTRx prescription, and spoke with the patient to determine the following telemedicine visit is appropriate and effective for the patient's care.    The following type of visit was completed:   Video Visit:  The [employer] platform uses a synchronous HIPAA compliant video stream for this patient encounter.        S: Ale Rose is a 54 year old female. Connected virtually on the PTRx platform to discuss their condition/progress. Pt states that overall she has noted good improvement in lateral leg pain.  Will note slight irritation of tendon in foot, but significantly improved.  Was able to run marathon distance with one episode o right leg buckling, but actually noted more issues with right leg that she managed by walking, modifying run.      O: Patient demonstrated Good tolerance to long run with right leg.  Has improved mobility in right leg chain.  Did note decreased mobility with right leg chain in opne chain that may be influencing foot placement at the end of swing phase.  Given ex to address.       PTRx Content from today's visit:  Exercise Name: Single Leg Squat at int - Reps: [id number] opposite leg to rest on floor. - Sessions: 1-2, Notes: Do on left side 1st-go to school on left-then do right.  Exercise Name: " "Ankle Stabilization Standing Pronation/Supination, Sets: 1-2 - Reps: 10 - Sessions: 1-2, Notes: Do one leg at time.  Do left leg first-then right. Try to may right as fluid as left  Exercise Name: Side-lying Positional Traction  Exercise Name: Helicopter Kicks - Reps: x10 - Sessions: 2-3, Notes: Go to school on left leg.  Focus more on left leg swing phase-\"reverse helicopter\"    A:   Patient is progressing as expected.  Response to therapy has shown an improvement in  pain level, strength, muscle control, gait and function      P: Patient will continue with the exercise program assigned on their PTRx code and will add the following measures to manage their pain/condition: Pt will work on current ex program.  Will call if further treatment is needed or with any questions.     Current treatment program is being advanced to more complex exercises.      Virtual visit contact time    Time of service began: 1:50 PM  Time of service ended: 2:30 PM  Total Time for set up, visit, and documentation: 40 minutes    Payor: PREFERREDONE / Plan: PREFERREDONE HMO / Product Type: PPO /   .  Procedure Code/s   Therapeutic Exercise (25272): 15 minutes  Neuromuscular Re-education (45836): 25 minutes    I have reviewed the note as documented above.  This accurately captures the substance of my conversation with the patient.  Provider location: [place], [place] ([facility])  Patient location: Home              "

## 2020-06-10 ENCOUNTER — THERAPY VISIT (OUTPATIENT)
Dept: CHIROPRACTIC MEDICINE | Facility: CLINIC | Age: 55
End: 2020-06-10
Payer: COMMERCIAL

## 2020-06-10 DIAGNOSIS — M99.05 SOMATIC DYSFUNCTION OF PELVIS REGION: Primary | ICD-10-CM

## 2020-06-10 DIAGNOSIS — M25.551 HIP PAIN, RIGHT: ICD-10-CM

## 2020-06-10 DIAGNOSIS — M79.10 MYALGIA: ICD-10-CM

## 2020-06-10 PROCEDURE — 98940 CHIROPRACT MANJ 1-2 REGIONS: CPT | Mod: AT | Performed by: CHIROPRACTOR

## 2020-06-10 PROCEDURE — 97110 THERAPEUTIC EXERCISES: CPT | Performed by: CHIROPRACTOR

## 2020-06-16 NOTE — PROGRESS NOTES
Subjective:  CC: R lateral leg, B hips  Goal: Speed work without minimal discomfort    Comes in today doing well.Since last session has had a couple of virtual sessions with Laney. Notes that has been feeling OK. Has been working full go and is very stressed. Notes that most races have been canceled but still running about 40 per week. She denies any new changes in health history. Notes with treatment has less pain and more motion so would like to continue. Most sore is R hip, R hamstring, R calf and ankle. Pain 3/10 on average.       Objective:  Inspection:  mild thickening over right achilles   No Scars  Normal gait    Palpation:  Palpable soreness over R IT, R BF, R VL, R lateral ankle   Myofascitis 2/4 noted over R IT, R BF, R hip ER's, R peroneal tendon    ROM:  Hip IR limited on left 5 degrees compared to right  Ankle DF limited on right with no pain  Ankle inversion full with tightness noted over right lateral ankle  Back extension 30/30 with mild lower back pain  Hip adduction full and pain free  SLR symmetric  tightness noted over R hamstring   Knee flexion full with lateral knee pain     MMT:  Left glute med 4/5 with no pain  Right glute med 4/5 with no pain  TFL 4/5 B with lateral hip discomfort  Heel raise 5/5 with no pain   Hamstrings 5/5 B with no pain   Ankle eversion 5/5 with mild pain on R    MET:  Left out flare    NAL:  Restricted SI on right    Ortho: SLR (tightness only), sloan and fader (-)    Assessment:  NAL with associated myofascitis and weakness   Hip pain   Ankle pain     Plan:   Patient tolerated treatment well today  Treatment Time: 45 minutes  91556 manipulation 1-2 segments: MET  01200 manipulation: ankle LAD  58151 Manual therapy: (ART, Graston, Strain Counter Strain, Fascial Manipulation, Cupping) performed over area of bilateral hamstrings, R calf, R lateral quad, B TFL, R hamstring, B glute med, R peroneal   70599 therapeutic exercise (20 minutes):   1. Calf stretching for  gastroc and soleus  2. Self plantar stretching  3. Single leg balance on [place] cushion  4. TFL stretching  5. Lateral hamstring stretching  6. Standing TFL stretching  7. side lying stretching over stability ball, piriformis stretching, marching bridges with ball squeeze   8.  straight leg bridges on stability ball, active isolated hamstring stretches, seated piriformis stretch  9.  only worked lower extremity stretching over quads, hamstrings, TFL, psoas.   10.  hip out flare exercises: left hip abduction, standing running pose with abduction, bridge with abduction, self active isolated stretching. We did review single leg step back and added some insight to make sure she feels it in her glute. Also review current PT exercises   11. she is seeing PT. Today we focused on hip stretching (TFL, hip ER). Condensed exercises down as she notes she is spending 1 hour a day doing them. We focused on stretching and hip extension exercises for glute strength. Added eccentric calfs   12.  single leg balance with opposite arm row, walking lunge with heel raise for balance, single leg paw drill, single leg bridge, figure 4 stretch  single leg bride, clam shell, side lying abduction, lateral band walks, cat / cow stretching, running RDL  13. L protocol for hamstring  Today: review of current PT exercises, self ankle motion   US: 5 minutes over R distal lateral hamstring (did not do)  Shock wave: 15/8 over lateral hamstring (did not do)  Strapping:  Hip IR   Next visit: PRN as does well with active care program

## 2020-06-24 ENCOUNTER — THERAPY VISIT (OUTPATIENT)
Dept: CHIROPRACTIC MEDICINE | Facility: CLINIC | Age: 55
End: 2020-06-24
Payer: COMMERCIAL

## 2020-06-24 DIAGNOSIS — M79.10 MYALGIA: ICD-10-CM

## 2020-06-24 DIAGNOSIS — M99.05 SOMATIC DYSFUNCTION OF PELVIS REGION: Primary | ICD-10-CM

## 2020-06-24 DIAGNOSIS — M25.551 HIP PAIN, RIGHT: ICD-10-CM

## 2020-06-24 PROCEDURE — 98940 CHIROPRACT MANJ 1-2 REGIONS: CPT | Mod: AT | Performed by: CHIROPRACTOR

## 2020-06-24 PROCEDURE — 97110 THERAPEUTIC EXERCISES: CPT | Performed by: CHIROPRACTOR

## 2020-06-29 NOTE — PROGRESS NOTES
Subjective:  CC: R lateral leg, B hips  Goal: Speed work without minimal discomfort    Comes in today doing well. Has been increasing her running. Since last visit did get a massage which helped as well. Notes similar symptoms of B hp tightness and right lateral leg. Able to train at high level without issues. Denies any new issues.       Objective:  Inspection:  mild thickening over right achilles   No Scars  Normal gait    Palpation:  Palpable soreness over R IT, R BF, R VL, R lateral ankle   Myofascitis 2/4 noted over R IT, R BF, R hip ER's, R peroneal tendon    ROM:  Hip IR limited on left 5 degrees compared to right  Ankle DF limited on right with no pain  Ankle inversion full with tightness noted over right lateral ankle  Back extension 30/30 with mild lower back pain  Hip adduction full and pain free  SLR symmetric  tightness noted over R hamstring   Knee flexion full with lateral knee pain     MMT:  Left glute med 4/5 with no pain  Right glute med 4/5 with no pain  TFL 4/5 B with lateral hip discomfort  Heel raise 5/5 with no pain   Hamstrings 5/5 B with no pain   Ankle eversion 5/5 with mild pain on R    MET:  Left out flare    NAL:  Restricted SI on right    Ortho: SLR (tightness only), sloan and armandoer (-)    Assessment:  NAL with associated myofascitis and weakness   Hip pain   Ankle pain     Plan:   Patient tolerated treatment well today  Treatment Time: 45 minutes  44436 manipulation 1-2 segments: MET  61990 manipulation: ankle LAD  05185 Manual therapy: (ART, Graston, Strain Counter Strain, Fascial Manipulation, Cupping) performed over area of bilateral hamstrings, R calf, R lateral quad, B TFL, R hamstring, B glute med, R peroneal   01559 therapeutic exercise (20 minutes):   1. Calf stretching for gastroc and soleus  2. Self plantar stretching  3. Single leg balance on [place] cushion  4. TFL stretching  5. Lateral hamstring stretching  6. Standing TFL stretching  7. side lying stretching over  stability ball, piriformis stretching, marching bridges with ball squeeze   8.  straight leg bridges on stability ball, active isolated hamstring stretches, seated piriformis stretch  9.  only worked lower extremity stretching over quads, hamstrings, TFL, psoas.   10.  hip out flare exercises: left hip abduction, standing running pose with abduction, bridge with abduction, self active isolated stretching. We did review single leg step back and added some insight to make sure she feels it in her glute. Also review current PT exercises   11. she is seeing PT. Today we focused on hip stretching (TFL, hip ER). Condensed exercises down as she notes she is spending 1 hour a day doing them. We focused on stretching and hip extension exercises for glute strength. Added eccentric calfs   12.  single leg balance with opposite arm row, walking lunge with heel raise for balance, single leg paw drill, single leg bridge, figure 4 stretch  single leg bride, clam shell, side lying abduction, lateral band walks, cat / cow stretching, running RDL  13. L protocol for hamstring  Today: review of current PT exercises, self ankle motion   US: 5 minutes over R distal lateral hamstring (did not do)  Shock wave: 15/8 over lateral hamstring (did not do)  Strapping:  Hip IR   Next visit: PRN as does well with active care program

## 2020-07-16 ENCOUNTER — THERAPY VISIT (OUTPATIENT)
Dept: CHIROPRACTIC MEDICINE | Facility: CLINIC | Age: 55
End: 2020-07-16
Payer: COMMERCIAL

## 2020-07-16 DIAGNOSIS — M79.10 MYALGIA: ICD-10-CM

## 2020-07-16 DIAGNOSIS — M25.551 HIP PAIN, RIGHT: ICD-10-CM

## 2020-07-16 DIAGNOSIS — M99.05 SOMATIC DYSFUNCTION OF PELVIS REGION: Primary | ICD-10-CM

## 2020-07-16 PROCEDURE — 98940 CHIROPRACT MANJ 1-2 REGIONS: CPT | Mod: AT | Performed by: CHIROPRACTOR

## 2020-07-16 PROCEDURE — 97110 THERAPEUTIC EXERCISES: CPT | Performed by: CHIROPRACTOR

## 2020-07-17 NOTE — PROGRESS NOTES
Subjective:  CC: R lateral leg, B hips  Goal: Speed work without minimal discomfort    Comes in today doing well. Has been increasing her running. Notes has been doing longer and slower runs and notes leg has given out a couple times. She notes most tightness over left lateral hip and right lateral leg. She notes still working on PT exercises. Denies any new issues. Denies any radiating pain. Pleased with progress.       Objective:  Inspection:  mild thickening over right achilles   No Scars  Normal gait    Palpation:  Palpable soreness over R IT, R BF, R VL, R lateral ankle, L TFL  Myofascitis 2/4 noted over R IT, R BF, R hip ER's, R peroneal tendon. L TFL    ROM:  Hip IR limited on left 5 degrees compared to right  Ankle DF limited on right with no pain  Ankle inversion full with tightness noted over right lateral ankle  Back extension 30/30 with mild lower back pain  Hip adduction full and pain free  SLR symmetric  tightness noted over R hamstring   Knee flexion full with lateral knee pain     MMT:  Left glute med 4/5 with no pain  Right glute med 4/5 with no pain  TFL 4/5 B with lateral hip discomfort  Heel raise 5/5 with no pain   Hamstrings 5/5 B with no pain   Ankle eversion 5/5 with mild pain on R    MET:  Left out flare    NAL:  Restricted SI on right    Ortho: SLR (tightness only), sloan and fader (-)    Assessment:  NAL with associated myofascitis and weakness   Hip pain   Ankle pain     Plan:   Patient tolerated treatment well today  Treatment Time: 45 minutes  98327 manipulation 1-2 segments: MET  39270 manipulation: ankle LAD  20187 Manual therapy: (ART, Graston, Strain Counter Strain, Fascial Manipulation, Cupping) performed over area of bilateral hamstrings, R calf, R lateral quad, B TFL, R hamstring, B glute med, R peroneal   39089 therapeutic exercise (20 minutes):   1. Calf stretching for gastroc and soleus  2. Self plantar stretching  3. Single leg balance on [place] cushion  4. TFL  stretching  5. Lateral hamstring stretching  6. Standing TFL stretching  7. side lying stretching over stability ball, piriformis stretching, marching bridges with ball squeeze   8.  straight leg bridges on stability ball, active isolated hamstring stretches, seated piriformis stretch  9.  only worked lower extremity stretching over quads, hamstrings, TFL, psoas.   10.  hip out flare exercises: left hip abduction, standing running pose with abduction, bridge with abduction, self active isolated stretching. We did review single leg step back and added some insight to make sure she feels it in her glute. Also review current PT exercises   11. she is seeing PT. Today we focused on hip stretching (TFL, hip ER). Condensed exercises down as she notes she is spending 1 hour a day doing them. We focused on stretching and hip extension exercises for glute strength. Added eccentric calfs   12.  single leg balance with opposite arm row, walking lunge with heel raise for balance, single leg paw drill, single leg bridge, figure 4 stretch  single leg bride, clam shell, side lying abduction, lateral band walks, cat / cow stretching, running RDL  13. L protocol for hamstring  Today: review of current PT exercises, self ankle motion   US: 5 minutes over R distal lateral hamstring (did not do)  Shock wave: 15/8 over lateral hamstring (did not do)  Strapping:  Hip IR   Next visit: PRN as does well with active care program

## 2020-07-28 ENCOUNTER — THERAPY VISIT (OUTPATIENT)
Dept: PHYSICAL THERAPY | Facility: CLINIC | Age: 55
End: 2020-07-28
Payer: COMMERCIAL

## 2020-07-28 DIAGNOSIS — M76.892 HAMSTRING TENDINITIS OF LEFT THIGH: Primary | ICD-10-CM

## 2020-07-28 DIAGNOSIS — M99.05 SOMATIC DYSFUNCTION OF PELVIS REGION: ICD-10-CM

## 2020-07-28 DIAGNOSIS — M79.10 MYALGIA: ICD-10-CM

## 2020-07-28 DIAGNOSIS — M25.551 HIP PAIN, RIGHT: ICD-10-CM

## 2020-07-28 PROCEDURE — 97110 THERAPEUTIC EXERCISES: CPT | Mod: GT | Performed by: PHYSICAL THERAPIST

## 2020-07-28 PROCEDURE — 97112 NEUROMUSCULAR REEDUCATION: CPT | Mod: GT | Performed by: PHYSICAL THERAPIST

## 2020-07-28 NOTE — PROGRESS NOTES
Subjective:  HPI  Physical Exam                    Objective:  System    Physical Exam    General     ROS    Assessment/Plan:    PROGRESS  REPORT    Progress reporting period is from 5/7/2020 to 7/28/2020.       SUBJECTIVE  Subjective changes noted by patient:  .  Subjective: Patient states  she started to note some symptoms on her uninvolved side that she states has happened in the past when she gets out of balance.  Has noted some right hip symptoms, buckling.  Overall is better.  States possible reasons for aggravation of symptoms could be running with slower running partners, increasing her mileage in preparation for a ultra marathon.  Also feels that interruption of treatment due to COVID-19 has been a contributing factor.    Current pain level is   .     Previous pain level was   Initial Pain level: 2/10(More painful twinges).   Changes in function:  Yes (See Goal flowsheet attached for changes in current functional level)  Adverse reaction to treatment or activity: None    OBJECTIVE  Changes noted in objective findings:    Objective: Patient has been seen by chiropractor several times with good relief of left-sided symptoms.  Is checking in today to go over exercise and progress as indicated.  Overall alignment looks good.  Has maintained good position of right hip in standing.  Did note a lack of ability to rotate into right foot with weightbearing activities.  Modified exercises to address.  Also noted tightness in dorsiflexion inversion on the right.  May be secondary to anterior tib tenosynovitis that has mainly resolved.  Patient being very compliant with home exercise program.  Tolerated treatment well.     ASSESSMENT/PLAN  Updated problem list and treatment plan: Diagnosis 1:  Right hip impingement/leg pain  Pain -  manual therapy, splint/taping/bracing/orthotics, education and home program  Decreased ROM/flexibility - manual therapy and therapeutic exercise  Decreased joint mobility - manual therapy  and therapeutic exercise  Decreased strength - therapeutic exercise and therapeutic activities  Impaired gait - gait training  Impaired muscle performance - neuro re-education  Decreased function - therapeutic activities  STG/LTGs have been met or progress has been made towards goals:  Yes (See Goal flow sheet completed today.)  Assessment of Progress: The patient's condition is improving.  Self Management Plans:  Patient is independent in a home treatment program.  Patient is independent in self management of symptoms.    Ale continues to require the following intervention to meet STG and LTG's:  PT    Recommendations:  Pt currently checking as needed.  Last seen virtually 5/7/2020.      Please refer to the daily flowsheet for treatment today, total treatment time and time spent performing 1:1 timed codes.

## 2020-08-05 ENCOUNTER — THERAPY VISIT (OUTPATIENT)
Dept: CHIROPRACTIC MEDICINE | Facility: CLINIC | Age: 55
End: 2020-08-05
Payer: COMMERCIAL

## 2020-08-05 DIAGNOSIS — M79.10 MYALGIA: ICD-10-CM

## 2020-08-05 DIAGNOSIS — M25.551 HIP PAIN, RIGHT: ICD-10-CM

## 2020-08-05 DIAGNOSIS — M99.05 SOMATIC DYSFUNCTION OF PELVIS REGION: Primary | ICD-10-CM

## 2020-08-05 PROCEDURE — 97110 THERAPEUTIC EXERCISES: CPT | Performed by: CHIROPRACTOR

## 2020-08-05 PROCEDURE — 98940 CHIROPRACT MANJ 1-2 REGIONS: CPT | Mod: AT | Performed by: CHIROPRACTOR

## 2020-08-06 NOTE — PROGRESS NOTES
Subjective:  CC: R lateral leg, B hips  Goal: Speed work without minimal discomfort    Comes in today doing well. Has been increasing her running. Did 50 K last weekend and body felt good. Still has races on [employer] and notes still training hard. Denies any new issues. Notes active care is helping. Has had less moments on right leg but also notes has not been running really slow. Denies any new changes in health history.       Objective:  Inspection:  mild thickening over right achilles   No Scars  Normal gait    Palpation:  Palpable soreness over R IT, R BF, R VL, R lateral ankle, L TFL  Myofascitis 2/4 noted over R IT, R BF, R hip ER's, R peroneal tendon. L TFL    ROM:  Hip IR limited on left 5 degrees compared to right  Ankle DF limited on right with no pain  Ankle inversion full with tightness noted over right lateral ankle  Back extension 30/30 with mild lower back pain  Hip adduction full and pain free  SLR symmetric  tightness noted over R hamstring   Knee flexion full with lateral knee pain     MMT:  Left glute med 4/5 with no pain  Right glute med 4/5 with no pain  TFL 4/5 B with lateral hip discomfort  Heel raise 5/5 with no pain   Hamstrings 5/5 B with no pain   Ankle eversion 5/5 with mild pain on R    MET:  Left out flare    NAL:  Restricted SI on right    Ortho: SLR (tightness only), sloan and fader (-)    Assessment:  NAL with associated myofascitis and weakness   Hip pain   Ankle pain     Plan:   Patient tolerated treatment well today  Treatment Time: 45 minutes  32878 manipulation 1-2 segments: MET  31421 manipulation: ankle LAD  03950 Manual therapy: (ART, Graston, Strain Counter Strain, Fascial Manipulation, Cupping) performed over area of bilateral hamstrings, R calf, R lateral quad, B TFL, R hamstring, B glute med, R peroneal   66773 therapeutic exercise (20 minutes):   1. Calf stretching for gastroc and soleus  2. Self plantar stretching  3. Single leg balance on [place] cushion  4. TFL  stretching  5. Lateral hamstring stretching  6. Standing TFL stretching  7. side lying stretching over stability ball, piriformis stretching, marching bridges with ball squeeze   8.  straight leg bridges on stability ball, active isolated hamstring stretches, seated piriformis stretch  9.  only worked lower extremity stretching over quads, hamstrings, TFL, psoas.   10.  hip out flare exercises: left hip abduction, standing running pose with abduction, bridge with abduction, self active isolated stretching. We did review single leg step back and added some insight to make sure she feels it in her glute. Also review current PT exercises   11. she is seeing PT. Today we focused on hip stretching (TFL, hip ER). Condensed exercises down as she notes she is spending 1 hour a day doing them. We focused on stretching and hip extension exercises for glute strength. Added eccentric calfs   12.  single leg balance with opposite arm row, walking lunge with heel raise for balance, single leg paw drill, single leg bridge, figure 4 stretch  single leg bride, clam shell, side lying abduction, lateral band walks, cat / cow stretching, running RDL  13. L protocol for hamstring  Today: review of current PT exercises, self ankle motion   US: 5 minutes over R distal lateral hamstring (did not do)  Shock wave: 15/8 over lateral hamstring (did not do)  Strapping:  Hip IR   Next visit: PRN as does well with active care program

## 2020-08-19 ENCOUNTER — THERAPY VISIT (OUTPATIENT)
Dept: CHIROPRACTIC MEDICINE | Facility: CLINIC | Age: 55
End: 2020-08-19
Payer: COMMERCIAL

## 2020-08-19 DIAGNOSIS — M79.10 MYALGIA: ICD-10-CM

## 2020-08-19 DIAGNOSIS — M99.05 SOMATIC DYSFUNCTION OF PELVIS REGION: Primary | ICD-10-CM

## 2020-08-19 DIAGNOSIS — M25.551 HIP PAIN, RIGHT: ICD-10-CM

## 2020-08-19 DIAGNOSIS — M25.552 HIP PAIN, LEFT: ICD-10-CM

## 2020-08-19 PROCEDURE — 98940 CHIROPRACT MANJ 1-2 REGIONS: CPT | Mod: AT | Performed by: CHIROPRACTOR

## 2020-08-19 PROCEDURE — 97110 THERAPEUTIC EXERCISES: CPT | Performed by: CHIROPRACTOR

## 2020-08-20 NOTE — PROGRESS NOTES
Subjective:  CC: R lateral leg, B hips  Goal: Speed work without minimal discomfort    Comes in today doing well. Has been increasing her running. Has race coming up in 2 weeks. Has been training great and running full go.Active care is going well. Has some tightness right achilles and calf. Notes some tightness left lateral hip. Overall pleased with progress. Responded well to last treatment.       Objective:  Inspection:  mild thickening over right achilles   No Scars  Normal gait    Palpation:  Palpable soreness over R IT, R BF, R VL, R lateral ankle, L TFL  Myofascitis 2/4 noted over R IT, R BF, R hip ER's, R peroneal tendon. L TFL    ROM:  Hip IR limited on left 5 degrees compared to right  Ankle DF limited on right with no pain  Ankle inversion full with tightness noted over right lateral ankle  Back extension 30/30 with mild lower back pain  Hip adduction full and pain free  SLR symmetric  tightness noted over R hamstring   Knee flexion full with lateral knee pain     MMT:  Left glute med 4/5 with no pain  Right glute med 4/5 with no pain  TFL 4/5 B with lateral hip discomfort  Heel raise 5/5 with no pain   Hamstrings 5/5 B with no pain   Ankle eversion 5/5 with mild pain on R    MET:  Left out flare    NAL:  Restricted SI on right    Ortho: SLR (tightness only), sloan and armandoer (-)    Assessment:  NAL with associated myofascitis and weakness   Hip pain   Ankle pain     Plan:   Patient tolerated treatment well today  Treatment Time: 45 minutes  37277 manipulation 1-2 segments: MET  03992 manipulation: ankle LAD  28056 Manual therapy: (ART, Graston, Strain Counter Strain, Fascial Manipulation, Cupping) performed over area of bilateral hamstrings, R calf, R lateral quad, B TFL, R hamstring, B glute med, R peroneal   15416 therapeutic exercise (20 minutes):   1. Calf stretching for gastroc and soleus  2. Self plantar stretching  3. Single leg balance on [place] cushion  4. TFL stretching  5. Lateral hamstring  stretching  6. Standing TFL stretching  7. side lying stretching over stability ball, piriformis stretching, marching bridges with ball squeeze   8.  straight leg bridges on stability ball, active isolated hamstring stretches, seated piriformis stretch  9.  only worked lower extremity stretching over quads, hamstrings, TFL, psoas.   10.  hip out flare exercises: left hip abduction, standing running pose with abduction, bridge with abduction, self active isolated stretching. We did review single leg step back and added some insight to make sure she feels it in her glute. Also review current PT exercises   11. she is seeing PT. Today we focused on hip stretching (TFL, hip ER). Condensed exercises down as she notes she is spending 1 hour a day doing them. We focused on stretching and hip extension exercises for glute strength. Added eccentric calfs   12.  single leg balance with opposite arm row, walking lunge with heel raise for balance, single leg paw drill, single leg bridge, figure 4 stretch  single leg bride, clam shell, side lying abduction, lateral band walks, cat / cow stretching, running RDL  13. L protocol for hamstring  Today: review of current PT exercises, self ankle motion   US: 5 minutes over R distal lateral hamstring (did not do)  Shock wave: 15/8 over lateral hamstring (did not do)  Strapping:  Hip IR   Next visit: PRN as does well with active care program

## 2020-09-29 ENCOUNTER — THERAPY VISIT (OUTPATIENT)
Dept: PHYSICAL THERAPY | Facility: CLINIC | Age: 55
End: 2020-09-29
Payer: COMMERCIAL

## 2020-09-29 DIAGNOSIS — M79.671 RIGHT FOOT PAIN: ICD-10-CM

## 2020-09-29 PROCEDURE — 97110 THERAPEUTIC EXERCISES: CPT | Mod: 95 | Performed by: PHYSICAL THERAPIST

## 2020-09-29 PROCEDURE — 97161 PT EVAL LOW COMPLEX 20 MIN: CPT | Mod: 95 | Performed by: PHYSICAL THERAPIST

## 2020-10-02 ENCOUNTER — THERAPY VISIT (OUTPATIENT)
Dept: CHIROPRACTIC MEDICINE | Facility: CLINIC | Age: 55
End: 2020-10-02
Payer: COMMERCIAL

## 2020-10-02 DIAGNOSIS — M79.10 MYALGIA: ICD-10-CM

## 2020-10-02 DIAGNOSIS — M99.05 SOMATIC DYSFUNCTION OF PELVIS REGION: Primary | ICD-10-CM

## 2020-10-02 DIAGNOSIS — M25.551 HIP PAIN, RIGHT: ICD-10-CM

## 2020-10-02 DIAGNOSIS — M25.571 PAIN IN JOINT, ANKLE AND FOOT, RIGHT: ICD-10-CM

## 2020-10-02 PROCEDURE — 98940 CHIROPRACT MANJ 1-2 REGIONS: CPT | Mod: AT | Performed by: CHIROPRACTOR

## 2020-10-02 PROCEDURE — 97110 THERAPEUTIC EXERCISES: CPT | Mod: GP | Performed by: CHIROPRACTOR

## 2020-10-04 NOTE — PROGRESS NOTES
Subjective:  CC: R lateral leg, B hips  Goal: Speed work without minimal discomfort    Comes in today doing OK. 3 weeks ago did 24 hour trail race and notes had some R medial knee pain and ankle pain. Notes has been getting better but still very tight. Had session with PT and worked on active ankle mobility. Denies any new changes in health history. Has been very stressed with life and is dealing with that. Most complaint is over R general ankle. Denies any swelling.       Objective:  Inspection:  mild thickening over right achilles   No Scars  Normal gait    Palpation:  Palpable soreness over R IT, R BF, R VL, R lateral ankle, L TFL  Myofascitis 2/4 noted over R IT, R BF, R hip ER's, R peroneal tendon. L TFL    ROM:  Hip IR limited on left 5 degrees compared to right  Ankle DF limited on right with no pain  Ankle inversion full with tightness noted over right lateral ankle  Back extension 30/30 with mild lower back pain  Hip adduction full and pain free  SLR symmetric  tightness noted over R hamstring   Knee flexion full with lateral knee pain     MMT:  Left glute med 4/5 with no pain  Right glute med 4/5 with no pain  TFL 4/5 B with lateral hip discomfort  Heel raise 5/5 with no pain   Hamstrings 5/5 B with no pain   Ankle eversion 5/5 with mild pain on R    MET:  Left out flare    NAL:  Restricted SI on right    Ortho: SLR (tightness only), sloan and armandoer (-)    Assessment:  NAL with associated myofascitis and weakness   Hip pain   Ankle pain     Plan:   Patient tolerated treatment well today  Treatment Time: 45 minutes  73070 manipulation 1-2 segments: MET  54349 manipulation: ankle LAD  65344 Manual therapy: (ART, Graston, Strain Counter Strain, Fascial Manipulation, Cupping) performed over area of bilateral hamstrings, R calf, R lateral quad, B TFL, R hamstring, B glute med, R peroneal   59353 therapeutic exercise (20 minutes):   1. Calf stretching for gastroc and soleus  2. Self plantar stretching  3.  Single leg balance on [place] cushion  4. TFL stretching  5. Lateral hamstring stretching  6. Standing TFL stretching  7. side lying stretching over stability ball, piriformis stretching, marching bridges with ball squeeze   8.  straight leg bridges on stability ball, active isolated hamstring stretches, seated piriformis stretch  9.  only worked lower extremity stretching over quads, hamstrings, TFL, psoas.   10.  hip out flare exercises: left hip abduction, standing running pose with abduction, bridge with abduction, self active isolated stretching. We did review single leg step back and added some insight to make sure she feels it in her glute. Also review current PT exercises   11. she is seeing PT. Today we focused on hip stretching (TFL, hip ER). Condensed exercises down as she notes she is spending 1 hour a day doing them. We focused on stretching and hip extension exercises for glute strength. Added eccentric calfs   12.  single leg balance with opposite arm row, walking lunge with heel raise for balance, single leg paw drill, single leg bridge, figure 4 stretch  single leg bride, clam shell, side lying abduction, lateral band walks, cat / cow stretching, running RDL  13. L protocol for hamstring  Today: review of current PT exercises, self ankle motion   US: 5 minutes over R distal lateral hamstring (did not do)  Shock wave: 15/8 over lateral hamstring (did not do)  Strapping:  Hip IR   Next visit: PRN as does well with active care program

## 2020-12-04 ENCOUNTER — THERAPY VISIT (OUTPATIENT)
Dept: CHIROPRACTIC MEDICINE | Facility: CLINIC | Age: 55
End: 2020-12-04
Payer: COMMERCIAL

## 2020-12-04 DIAGNOSIS — M25.571 PAIN IN JOINT, ANKLE AND FOOT, RIGHT: ICD-10-CM

## 2020-12-04 DIAGNOSIS — M99.05 SOMATIC DYSFUNCTION OF PELVIS REGION: Primary | ICD-10-CM

## 2020-12-04 DIAGNOSIS — M25.551 HIP PAIN, RIGHT: ICD-10-CM

## 2020-12-04 DIAGNOSIS — M79.10 MYALGIA: ICD-10-CM

## 2020-12-04 PROCEDURE — 98940 CHIROPRACT MANJ 1-2 REGIONS: CPT | Mod: AT | Performed by: CHIROPRACTOR

## 2020-12-04 PROCEDURE — 97110 THERAPEUTIC EXERCISES: CPT | Performed by: CHIROPRACTOR

## 2020-12-05 NOTE — PROGRESS NOTES
Subjective:  CC: R lateral leg, B hips  Goal: Speed work without minimal discomfort    Comes in today doing OK. Have not seen in little while due to some family issues on her side and scheduling on my side. She is dealing with a lot of stress in life but has good support system. Notes she has done virtual visit with Laney and started some ankle exercises again. Did have a couple session with Nick Verde for shock wave on R lateral ankle. Notes leg is tight. Denies any new changes in health history. Pain is 3/10. Worse with standing and driving.       Objective:  Inspection:  mild thickening over right achilles   No Scars  Normal gait    Palpation:  Palpable soreness over R IT, R BF, R VL, R lateral ankle, L TFL  Myofascitis 2/4 noted over R IT, R BF, R hip ER's, R peroneal tendon. L TFL    ROM:  Hip IR limited on left 5 degrees compared to right  Ankle DF limited on right with no pain  Ankle inversion full with tightness noted over right lateral ankle  Back extension 30/30 with mild lower back pain  Hip adduction full and pain free  SLR symmetric  tightness noted over R hamstring   Knee flexion full with lateral knee pain     MMT:  Left glute med 4/5 with no pain  Right glute med 4/5 with no pain  TFL 4/5 B with lateral hip discomfort  Heel raise 5/5 with no pain   Hamstrings 5/5 B with no pain   Ankle eversion 5/5 with mild pain on R    MET:  Left out flare    NAL:  Restricted SI on right    Ortho: SLR (tightness only), sloan and fader (-)    Assessment:  NAL with associated myofascitis and weakness   Hip pain   Ankle pain     Plan:   Patient tolerated treatment well today  Treatment Time: 45 minutes  60658 manipulation 1-2 segments: MET  41172 manipulation: ankle LAD  73119 Manual therapy: (ART, Graston, Strain Counter Strain, Fascial Manipulation, Cupping) performed over area of bilateral hamstrings, R calf, R lateral quad, B TFL, R hamstring, B glute med, R peroneal   61007 therapeutic exercise (20 minutes):    1. Calf stretching for gastroc and soleus  2. Self plantar stretching  3. Single leg balance on Beaver cushion  4. TFL stretching  5. Lateral hamstring stretching  6. Standing TFL stretching  7. side lying stretching over stability ball, piriformis stretching, marching bridges with ball squeeze   8.  straight leg bridges on stability ball, active isolated hamstring stretches, seated piriformis stretch  9.  only worked lower extremity stretching over quads, hamstrings, TFL, psoas.   10.  hip out flare exercises: left hip abduction, standing running pose with abduction, bridge with abduction, self active isolated stretching. We did review single leg step back and added some insight to make sure she feels it in her glute. Also review current PT exercises   11. she is seeing PT. Today we focused on hip stretching (TFL, hip ER). Condensed exercises down as she notes she is spending 1 hour a day doing them. We focused on stretching and hip extension exercises for glute strength. Added eccentric calfs   12.  single leg balance with opposite arm row, walking lunge with heel raise for balance, single leg paw drill, single leg bridge, figure 4 stretch  single leg bride, clam shell, side lying abduction, lateral band walks, cat / cow stretching, running RDL  13. L protocol for hamstring  Today: review of current PT exercises, self ankle motion, pronation roll on ankle, hip PNF  US: 5 minutes over R distal lateral hamstring (did not do)  Shock wave: 15/8 over lateral hamstring (did not do)  Strapping:  Hip IR   Next visit: PRN as does well with active care program  Today: Worked on first ray mobility and patients ability to pronate

## 2020-12-17 ENCOUNTER — THERAPY VISIT (OUTPATIENT)
Dept: PHYSICAL THERAPY | Facility: CLINIC | Age: 55
End: 2020-12-17
Payer: COMMERCIAL

## 2020-12-17 DIAGNOSIS — M79.671 RIGHT FOOT PAIN: ICD-10-CM

## 2020-12-17 PROCEDURE — 97112 NEUROMUSCULAR REEDUCATION: CPT | Mod: 95 | Performed by: PHYSICAL THERAPIST

## 2020-12-17 PROCEDURE — 97110 THERAPEUTIC EXERCISES: CPT | Mod: 95 | Performed by: PHYSICAL THERAPIST

## 2020-12-17 NOTE — PROGRESS NOTES
Subjective:  HPI  Physical Exam                    Objective:  System    Physical Exam    General     ROS    Assessment/Plan:    PROGRESS  REPORT    Progress reporting period is from 9/29/2020 to 12/17/2020.       SUBJECTIVE  Subjective changes noted by patient:  .  Subjective: Pt states that she has had good response from shock wave.  Worked with Dr. Rossi earlier this week.  Significant limits didier in 1st ray, great toe ext.  Has been able to return to her training.  Has been unable to utilize pool work outs due to COVID.  Will note return of right thigh/hip pain dysfunction.      Current pain level is   .     Previous pain level was   Initial Pain level: 4/10.   Changes in function:  Yes (See Goal flowsheet attached for changes in current functional level)  Adverse reaction to treatment or activity: None    OBJECTIVE  Changes noted in objective findings:    Objective: Looking for common thread linking hip, LB, foot/ankle.  Checked slump-noted (+) tension sign on the right.  Given ex to address.  Reviewed current program.     ASSESSMENT/PLAN  Updated problem list and treatment plan: Diagnosis 1:   Right foot pain    Pain -  self management, education and home program  Decreased ROM/flexibility - manual therapy and therapeutic exercise  Decreased joint mobility - manual therapy and therapeutic exercise  Decreased strength - therapeutic exercise and therapeutic activities  Impaired gait - gait training  Impaired muscle performance - neuro re-education  Decreased function - therapeutic activities  STG/LTGs have been met or progress has been made towards goals:  Yes (See Goal flow sheet completed today.)  Assessment of Progress: The patient's condition is improving.  Self Management Plans:  Patient is independent in a home treatment program.  Patient is independent in self management of symptoms.    Ale continues to require the following intervention to meet STG and LTG's:  PT    Recommendations:  Pt to work on  HEP and reach out to therapist as needed.    Please refer to the daily flowsheet for treatment today, total treatment time and time spent performing 1:1 timed codes.

## 2020-12-18 ENCOUNTER — THERAPY VISIT (OUTPATIENT)
Dept: CHIROPRACTIC MEDICINE | Facility: CLINIC | Age: 55
End: 2020-12-18
Payer: COMMERCIAL

## 2020-12-18 DIAGNOSIS — M25.551 HIP PAIN, RIGHT: ICD-10-CM

## 2020-12-18 DIAGNOSIS — M99.05 SOMATIC DYSFUNCTION OF PELVIS REGION: Primary | ICD-10-CM

## 2020-12-18 DIAGNOSIS — M79.10 MYALGIA: ICD-10-CM

## 2020-12-18 PROCEDURE — 97110 THERAPEUTIC EXERCISES: CPT | Performed by: CHIROPRACTOR

## 2020-12-18 PROCEDURE — 98940 CHIROPRACT MANJ 1-2 REGIONS: CPT | Mod: AT | Performed by: CHIROPRACTOR

## 2020-12-20 NOTE — PROGRESS NOTES
Subjective:  CC: R lateral leg, B hips  Goal: Speed work without minimal discomfort    Comes in today doing OK.Has been doing better since last session. Did long run and has another this weekend. Had virtual appointment with PT and they gave her slump nerve tension flossing. Notes it seems to be helping. She denies any new issues. Most complaints is over right leg and hip. Notes is using Voltaren Gell for achilles and that is helping. Denies any new changes in health history. Pain is 3/10 and worse after running.       Objective:  Inspection:  mild thickening over right achilles   No Scars  Normal gait    Palpation:  Palpable soreness over R IT, R BF, R VL, R lateral ankle, L TFL  Myofascitis 2/4 noted over R IT, R BF, R hip ER's, R peroneal tendon. L TFL    ROM:  Hip IR limited on left 5 degrees compared to right  Ankle DF limited on right with no pain  Ankle inversion full with tightness noted over right lateral ankle  Back extension 30/30 with mild lower back pain  Hip adduction full and pain free  SLR symmetric  tightness noted over R hamstring   Knee flexion full with lateral knee pain     MMT:  Left glute med 4/5 with no pain  Right glute med 4/5 with no pain  TFL 4/5 B with lateral hip discomfort  Heel raise 5/5 with no pain   Hamstrings 5/5 B with no pain   Ankle eversion 5/5 with mild pain on R    MET:  Left out flare    NAL:  Restricted SI on right    Ortho: SLR (tightness only), sloan and fader (-)    Assessment:  NAL with associated myofascitis and weakness   Hip pain   Ankle pain     Plan:   Patient tolerated treatment well today  Treatment Time: 45 minutes  74266 manipulation 1-2 segments: MET  47762 manipulation: ankle LAD  67792 Manual therapy: (ART, Graston, Strain Counter Strain, Fascial Manipulation, Cupping) performed over area of bilateral hamstrings, R calf, R lateral quad, B TFL, R hamstring, B glute med, R peroneal   33255 therapeutic exercise (20 minutes):   1. Calf stretching for gastroc  and soleus  2. Self plantar stretching  3. Single leg balance on [place] cushion  4. TFL stretching  5. Lateral hamstring stretching  6. Standing TFL stretching  7. side lying stretching over stability ball, piriformis stretching, marching bridges with ball squeeze   8.  straight leg bridges on stability ball, active isolated hamstring stretches, seated piriformis stretch  9.  only worked lower extremity stretching over quads, hamstrings, TFL, psoas.   10.  hip out flare exercises: left hip abduction, standing running pose with abduction, bridge with abduction, self active isolated stretching. We did review single leg step back and added some insight to make sure she feels it in her glute. Also review current PT exercises   11. she is seeing PT. Today we focused on hip stretching (TFL, hip ER). Condensed exercises down as she notes she is spending 1 hour a day doing them. We focused on stretching and hip extension exercises for glute strength. Added eccentric calfs   12.  single leg balance with opposite arm row, walking lunge with heel raise for balance, single leg paw drill, single leg bridge, figure 4 stretch  single leg bride, clam shell, side lying abduction, lateral band walks, cat / cow stretching, running RDL  13. L protocol for hamstring  Today: review of current PT exercises, self ankle motion, pronation roll on ankle, hip PNF  US: 5 minutes over R distal lateral hamstring (did not do)  Shock wave: 15/8 over lateral hamstring (did not do)  Strapping:  Hip IR   Next visit: PRN as does well with active care program  Today: Worked on first ray mobility and patients ability to pronate

## 2021-03-02 ENCOUNTER — THERAPY VISIT (OUTPATIENT)
Dept: PHYSICAL THERAPY | Facility: CLINIC | Age: 56
End: 2021-03-02
Payer: COMMERCIAL

## 2021-03-02 DIAGNOSIS — M79.671 RIGHT FOOT PAIN: ICD-10-CM

## 2021-03-02 PROCEDURE — 97110 THERAPEUTIC EXERCISES: CPT | Mod: 95 | Performed by: PHYSICAL THERAPIST

## 2021-03-02 PROCEDURE — 97112 NEUROMUSCULAR REEDUCATION: CPT | Mod: 95 | Performed by: PHYSICAL THERAPIST

## 2021-03-02 NOTE — PROGRESS NOTES
Subjective:  HPI  Physical Exam                    Objective:  System    Physical Exam    General     ROS    Assessment/Plan:    PROGRESS  REPORT    Progress reporting period is from 12/17/2020 to 3/1/2021.       SUBJECTIVE  Subjective changes noted by patient:  .  Subjective: Pt states that since the end of January, beginning of Feb, she has noted persistent pain in right foot.  Staes the race she was traing for was canceled and she was in her taper phase. Her normal recovery routine failed to settle pain and she was seen by Dr. Verde.  Dr. Verde ordered an MRI that revealed some tears in peroneals, bone edema, and tenosynovitis.  None required surgery.  Is using Volterin in area 2-3x/day.    Current pain level is   .     Previous pain level was   Initial Pain level: 4/10.   Changes in function:  Yes (See Goal flowsheet attached for changes in current functional level)  Adverse reaction to treatment or activity: None    OBJECTIVE  Changes noted in objective findings:    Objective: Pt was in Kineseotape for peroneal tendonds, but tape appeared to accentuate tendency to stay lateral in foot.  First ray appears to have a functional rigidus.  Limit appears to be in rear foot, lower mediql leg.  Went through ex.  Modified.  May benefit for some manual work.     ASSESSMENT/PLAN  Updated problem list and treatment plan: Diagnosis 1:  Right foot pain      Pain -  self management, education and home program  Decreased ROM/flexibility - manual therapy and therapeutic exercise  Decreased joint mobility - manual therapy and therapeutic exercise  Decreased strength - therapeutic exercise and therapeutic activities  Impaired gait - gait training  Impaired muscle performance - neuro re-education  Decreased function - therapeutic activities  STG/LTGs have been met or progress has been made towards goals:  Yes (See Goal flow sheet completed today.)  Assessment of Progress: The patient's condition has potential to improve.  Self  Management Plans:  Patient has been instructed in a home treatment program.  Patient  has been instructed in self management of symptoms.    Ale continues to require the following intervention to meet STG and LTG's:  PT    Recommendations:  This patient would benefit from continued therapy.     Frequency:  1 X week, once daily  Duration:  for 5-7 visits        Please refer to the daily flowsheet for treatment today, total treatment time and time spent performing 1:1 timed codes.

## 2021-03-10 ENCOUNTER — THERAPY VISIT (OUTPATIENT)
Dept: PHYSICAL THERAPY | Facility: CLINIC | Age: 56
End: 2021-03-10
Payer: COMMERCIAL

## 2021-03-10 DIAGNOSIS — M79.671 RIGHT FOOT PAIN: ICD-10-CM

## 2021-03-10 PROCEDURE — 97140 MANUAL THERAPY 1/> REGIONS: CPT | Mod: GP | Performed by: PHYSICAL THERAPIST

## 2021-03-10 PROCEDURE — 97110 THERAPEUTIC EXERCISES: CPT | Mod: GP | Performed by: PHYSICAL THERAPIST

## 2021-03-11 NOTE — PROGRESS NOTES
Subjective:  Patient returns to in clinic visits for treatment of right foot pain.  Leading up to this visit she has been working with a different provider through virtual visits due to COVID-19 restrictions.  Patient notes she feels comfortable coming into clinic visits and does feel that the hands-on treatment is essential for the progression of her recovery.  Today we reviewed current functional status findings and reviewed plan of care.  Patient notes that she has had many different exercises and plans and would like to narrow down and focus so that she knows priorities and can focus her work on those exercises.    Review of current issues patient reports that she had been running well leading into December the surface that she was running on outdoors was relatively clear.  In January and February the surface became more icy when she feels that might contribute to her increasing pain in her right foot.    She also notes that she had done some hiking with different shoes and feels that that rubbing in that area may have also been contributing factor.  Historically she has had issues with right hip hamstring and occasionally feels that she does not have stability into her right leg.  She has had recent MRI see chart for detail findings with peroneus longus tearing and marrow edema reported.  She has pain with walking, transitional motions.  Feels that she tends to laterally weight-bear.  She is currently using a brace that has been helpful.  She does have a prominence of the bony area on lateral right foot inferior to medial malleolus.  She has had some success with padding that area to reduce friction.           Physical Exam                    Objective:  Standing Alignment:        Lumbar:  Lordosis decr and high lumbar lordosis            Gait:  Decrease stance time right, decreased pushoff right, decreased stride length left, trunk lean right with left stance, decreased knee extension right, slight backward  lean at shoulders  Gait Type:  Antalgic   Assistive Devices:  Brace      Flexibility/Screens:   Positive screens:  Hip (Decreased left hip ER moderate with firm end feel) and Lumbar (Moderate loss lumbar extension, left single-leg stance extension moderate to maximal loss with right trunk rotation substitution.  Right single-leg stance lumbar extension moderate loss with decreased stability and control related to foot and ankle pain)    Lower Extremity:  Decreased left lower extremity flexibility:Soleus    Decreased right lower extremity flexibility:  Hip ER's; Piriformis; Hip Flexors; Gastroc and Soleus          Ankle/Foot Evaluation  ROM:      PROM:                  Endfeel:  Firm end feel moderate decreased right DF PROM, decreased mobility first right great toe decreased reports mobility moderate to maximal loss        PALPATION:     Right ankle tenderness present at:   gastroc/soleus and lateral malleolus  EDEMA: Edema ankle: Local posterior inferior to lateral malleolus right                                                               General     ROS    Assessment/Plan:    Patient is a 55 year old female with right foot complaints.    Patient has the following significant findings with corresponding treatment plan.                Diagnosis 1: Right foot pain Pain -  hot/cold therapy, manual therapy and self management  Decreased ROM/flexibility - manual therapy and therapeutic exercise  Decreased joint mobility - manual therapy and therapeutic exercise  Decreased strength - therapeutic exercise and therapeutic activities  Inflammation - cold therapy  Impaired gait - gait training  Impaired muscle performance - neuro re-education  Decreased function - therapeutic activities  Impaired posture - neuro re-education    Therapy Evaluation Codes:   1) History comprised of:   Personal factors that impact the plan of care:      None.    Comorbidity factors that impact the plan of care are:      None.     Medications  impacting care: None.  2) Examination of Body Systems comprised of:   Body structures and functions that impact the plan of care:      Ankle, Hip, Knee, Lumbar spine, Pelvis and Toes.   Activity limitations that impact the plan of care are:      Lifting, Running, Sports, Squatting/kneeling, Stairs, Standing, Walking and Working.  3) Clinical presentation characteristics are:   Stable/Uncomplicated.  4) Decision-Making    Low complexity using standardized patient assessment instrument and/or measureable assessment of functional outcome.  Cumulative Therapy Evaluation is: Low complexity.    Previous and current functional limitations:  (See Goal Flow Sheet for this information)    Short term and Long term goals: (See Goal Flow Sheet for this information)     Communication ability:  Patient appears to be able to clearly communicate and understand verbal and written communication and follow directions correctly.  Treatment Explanation - The following has been discussed with the patient:   RX ordered/plan of care  Anticipated outcomes  Possible risks and side effects  This patient would benefit from PT intervention to resume normal activities.   Rehab potential is excellent.    Frequency:  1 X week, once daily  Duration:  for 6 weeks  Discharge Plan:  Achieve all LTG.  Independent in home treatment program.  Reach maximal therapeutic benefit.    Please refer to the daily flowsheet for treatment today, total treatment time and time spent performing 1:1 timed codes.

## 2021-03-29 ENCOUNTER — THERAPY VISIT (OUTPATIENT)
Dept: PHYSICAL THERAPY | Facility: CLINIC | Age: 56
End: 2021-03-29
Payer: COMMERCIAL

## 2021-03-29 DIAGNOSIS — M79.671 RIGHT FOOT PAIN: ICD-10-CM

## 2021-03-29 PROCEDURE — 97140 MANUAL THERAPY 1/> REGIONS: CPT | Mod: GP | Performed by: PHYSICAL THERAPIST

## 2021-03-29 PROCEDURE — 97110 THERAPEUTIC EXERCISES: CPT | Mod: GP | Performed by: PHYSICAL THERAPIST

## 2021-04-08 ENCOUNTER — TRANSFERRED RECORDS (OUTPATIENT)
Dept: HEALTH INFORMATION MANAGEMENT | Facility: CLINIC | Age: 56
End: 2021-04-08

## 2021-04-15 ENCOUNTER — MEDICAL CORRESPONDENCE (OUTPATIENT)
Dept: HEALTH INFORMATION MANAGEMENT | Facility: CLINIC | Age: 56
End: 2021-04-15

## 2021-04-19 ENCOUNTER — TRANSCRIBE ORDERS (OUTPATIENT)
Dept: OTHER | Age: 56
End: 2021-04-19

## 2021-04-19 ENCOUNTER — TELEPHONE (OUTPATIENT)
Dept: OTOLARYNGOLOGY | Facility: CLINIC | Age: 56
End: 2021-04-19
Payer: COMMERCIAL

## 2021-04-19 DIAGNOSIS — R42 VERTIGO: Primary | ICD-10-CM

## 2021-04-19 NOTE — TELEPHONE ENCOUNTER
[facility]    Phone Message    May a detailed message be left on voicemail: no     Reason for Call: Appointment Intake    Referring Provider Name: Samaria tate [facility] referring to Dr. Shafer - vertigo  Diagnosis and/or Symptoms: Vertigo    Action Taken: Message routed to:  [facility] ([facility]INIC [facility]-EYE-DENTAL-ENT- [[id number]]    Travel Screening: Not Applicable                                 Requesting Dr. Shafer

## 2021-04-20 ENCOUNTER — TELEPHONE (OUTPATIENT)
Dept: OTOLARYNGOLOGY | Facility: CLINIC | Age: 56
End: 2021-04-20

## 2021-04-20 ENCOUNTER — TRANSCRIBE ORDERS (OUTPATIENT)
Dept: OTHER | Age: 56
End: 2021-04-20

## 2021-04-20 DIAGNOSIS — R42 VERTIGO: Primary | ICD-10-CM

## 2021-04-21 ENCOUNTER — THERAPY VISIT (OUTPATIENT)
Dept: PHYSICAL THERAPY | Facility: CLINIC | Age: 56
End: 2021-04-21
Payer: COMMERCIAL

## 2021-04-21 DIAGNOSIS — R42 VERTIGO: Primary | ICD-10-CM

## 2021-04-21 DIAGNOSIS — R42 DIZZINESS: ICD-10-CM

## 2021-04-21 PROCEDURE — 97161 PT EVAL LOW COMPLEX 20 MIN: CPT | Mod: GP | Performed by: PHYSICAL THERAPIST

## 2021-04-21 PROCEDURE — 97112 NEUROMUSCULAR REEDUCATION: CPT | Mod: GP | Performed by: PHYSICAL THERAPIST

## 2021-05-05 ENCOUNTER — THERAPY VISIT (OUTPATIENT)
Dept: PHYSICAL THERAPY | Facility: CLINIC | Age: 56
End: 2021-05-05
Payer: COMMERCIAL

## 2021-05-05 DIAGNOSIS — R42 VERTIGO: Primary | ICD-10-CM

## 2021-05-05 PROCEDURE — 97112 NEUROMUSCULAR REEDUCATION: CPT | Mod: GP | Performed by: PHYSICAL THERAPIST

## 2021-06-16 ENCOUNTER — THERAPY VISIT (OUTPATIENT)
Dept: PHYSICAL THERAPY | Facility: CLINIC | Age: 56
End: 2021-06-16
Payer: COMMERCIAL

## 2021-06-16 DIAGNOSIS — R42 DIZZINESS: ICD-10-CM

## 2021-06-16 DIAGNOSIS — R42 VERTIGO: Primary | ICD-10-CM

## 2021-06-16 PROCEDURE — 97112 NEUROMUSCULAR REEDUCATION: CPT | Mod: GP | Performed by: PHYSICAL THERAPIST

## 2021-08-10 NOTE — TELEPHONE ENCOUNTER
Phone call to RAKEL Alfaro. Informed that Dr. Abdi is out of the office and will address physical therapy orders tomorrow when he returns to the office. She states she also had messaged Dr. Abdi yesterday regarding orders. She is aware they can see patient without orders and will inform patient we will be sending one.     RAJIV Cooper RN     Quality 431: Preventive Care And Screening: Unhealthy Alcohol Use - Screening: Patient screened for unhealthy alcohol use using a single question and scores less than 2 times per year Quality 226: Preventive Care And Screening: Tobacco Use: Screening And Cessation Intervention: Patient screened for tobacco use and is an ex/non-smoker Quality 131: Pain Assessment And Follow-Up: Pain assessment using a standardized tool is documented as negative, no follow-up plan required Quality 130: Documentation Of Current Medications In The Medical Record: Current Medications Documented Detail Level: Detailed Quality 110: Preventive Care And Screening: Influenza Immunization: Influenza Immunization previously received during influenza season

## 2021-11-14 PROBLEM — M79.671 RIGHT FOOT PAIN: Status: RESOLVED | Noted: 2020-09-29 | Resolved: 2021-11-14

## 2021-11-14 NOTE — PROGRESS NOTES
DISCHARGE REPORT    Ale did not return for further treatment after the last visit on 3/29/21.   Current status is unknown.  Please see information below for last known relevant information.  Patient seen for 5 visits.    SUBJECTIVE  Subjective changes noted by patient:  She has done some running alternating with pool workouts. Pt notes she saw MD who said if she runs the race she has planned in one month that she would not compromize her running future. He advised a smaller pul on brace and did feel there have been improvements in functiona and status of R foot. She did see another provider for ART since her last visit here , she has trialed tape for peroneal tendon unloading and using the foam channel supports helpful too.   .  Current pain level is  .     Previous pain level was  4/10.   Changes in function: (See Goal flowsheet attached for changes in current functional level)  Adverse reaction to treatment or activity: None    OBJECTIVE  Changes noted in objective findings:   Mod loss lumbar ext with right SL stance, improved quality of motion. Mid foor mobmin loss R, rearfoor moderate dec eversion but improved quality of motion, cuboid min loss mob R. Gait with slight post lean, improved with cues today (historic issue)      ASSESSMENT/PLAN  Diagnosis: R foot pain   Updated problem list and treatment plan:  Patient will continue to utilize HEP for any remaining deficits.   STG/LTGs have been met or progress has been made towards goals:  Please see goal flowsheet for most current information  Assessment of Progress: current status is unknown.    Last current status:     Self Management Plans:  HEP  I have re-evaluated this patient and find that the nature, scope, duration and intensity of the therapy is appropriate for the medical condition of the patient.  Ale continues to require the following intervention to meet STG and LTG's:  HEP.    Recommendations:  Discharge with current home program.  Patient to  follow up with MD as needed.    Please refer to the daily flowsheet for treatment today, total treatment time and time spent performing 1:1 timed codes.

## 2024-08-22 ENCOUNTER — OFFICE VISIT (OUTPATIENT)
Dept: FAMILY MEDICINE | Facility: CLINIC | Age: 59
End: 2024-08-22
Payer: COMMERCIAL

## 2024-08-22 VITALS
HEART RATE: 50 BPM | SYSTOLIC BLOOD PRESSURE: 121 MMHG | TEMPERATURE: 98 F | WEIGHT: 150 LBS | RESPIRATION RATE: 18 BRPM | OXYGEN SATURATION: 99 % | DIASTOLIC BLOOD PRESSURE: 76 MMHG | BODY MASS INDEX: 22.48 KG/M2

## 2024-08-22 DIAGNOSIS — L03.012 PARONYCHIA OF FINGER OF LEFT HAND: Primary | ICD-10-CM

## 2024-08-22 PROCEDURE — 99203 OFFICE O/P NEW LOW 30 MIN: CPT | Performed by: FAMILY MEDICINE

## 2024-08-22 RX ORDER — DOXYCYCLINE HYCLATE 100 MG
100 TABLET ORAL 2 TIMES DAILY
Qty: 14 TABLET | Refills: 0 | Status: SHIPPED | OUTPATIENT
Start: 2024-08-22 | End: 2024-08-29

## 2024-08-22 NOTE — PROGRESS NOTES
Assessment & Plan     Paronychia of finger of left hand  PCN allergy with anaphylaxis and can't see that has taken cephalosporin so went to doxy  - doxycycline hyclate (VIBRA-TABS) 100 MG tablet  Dispense: 14 tablet; Refill: 0             No follow-ups on file.    MD [facility]    Sulma Aguilera is a 59 year old female who presents to clinic today for the following health issues:  Chief Complaint   Patient presents with    Finger     Middle left hand finger. Swelling and possibly infected. Noticeable symptoms started yesterday. Previously thought it was just a small paper.          8/22/2024     5:34 PM   Additional Questions   Roomed by Sherley BLANCHARD   Accompanied by self     HPI    Middle finger  Fell running last week.  Swelliing finger.  Thought was paper cut  Neosporin.  Red and pain to move it.          Review of Systems        Objective    /76   Pulse 50   Temp 98  F (36.7  C) (Oral)   Resp 18   Wt 68 kg (150 lb)   SpO2 99%   BMI 22.48 kg/m    Physical Exam  Vitals and nursing note reviewed.   Constitutional:       Appearance: Normal appearance.   Skin:     Comments: Left middle finger with some erythema distal phalanx  Over joint small paper like cut   No exudate   Neurological:      Mental Status: She is alert.

## 2024-12-06 ENCOUNTER — LAB REQUISITION (OUTPATIENT)
Dept: LAB | Facility: CLINIC | Age: 59
End: 2024-12-06
Payer: COMMERCIAL

## 2024-12-06 DIAGNOSIS — R22.32 LOCALIZED SWELLING, MASS AND LUMP, LEFT UPPER LIMB: ICD-10-CM

## 2024-12-06 PROCEDURE — 88305 TISSUE EXAM BY PATHOLOGIST: CPT | Mod: TC,ORL | Performed by: ORTHOPAEDIC SURGERY

## 2024-12-10 LAB
PATH REPORT.COMMENTS IMP SPEC: NORMAL
PATH REPORT.COMMENTS IMP SPEC: NORMAL
PATH REPORT.FINAL DX SPEC: NORMAL
PATH REPORT.GROSS SPEC: NORMAL
PATH REPORT.MICROSCOPIC SPEC OTHER STN: NORMAL
PATH REPORT.RELEVANT HX SPEC: NORMAL
PHOTO IMAGE: NORMAL

## 2024-12-10 PROCEDURE — 88305 TISSUE EXAM BY PATHOLOGIST: CPT | Mod: 26 | Performed by: PATHOLOGY

## 2025-05-01 ENCOUNTER — TRANSFERRED RECORDS (OUTPATIENT)
Dept: HEALTH INFORMATION MANAGEMENT | Facility: CLINIC | Age: 60
End: 2025-05-01

## 2025-06-26 ENCOUNTER — TELEPHONE (OUTPATIENT)
Dept: OPHTHALMOLOGY | Facility: CLINIC | Age: 60
End: 2025-06-26

## 2025-06-26 ENCOUNTER — TRANSCRIBE ORDERS (OUTPATIENT)
Dept: OTHER | Age: 60
End: 2025-06-26

## 2025-06-26 ENCOUNTER — TRANSFERRED RECORDS (OUTPATIENT)
Dept: HEALTH INFORMATION MANAGEMENT | Facility: CLINIC | Age: 60
End: 2025-06-26
Payer: COMMERCIAL

## 2025-06-26 DIAGNOSIS — H43.811 POSTERIOR VITREOUS DEGENERATION, RIGHT: Primary | ICD-10-CM

## 2025-06-26 NOTE — TELEPHONE ENCOUNTER
[facility] Call Center    Phone Message    May a detailed message be left on voicemail: yes     Reason for Call: Appointment Intake    Referring Provider Name: Dr. Idalmis Birch with [facility].     Diagnosis and/or Symptoms: Rule out possible retina tear in right eye.     Records are being faxed.     She would like a call back from care team to discuss when patient can be seen as this is urgent. She is requesting Dr. Flores. Idalmis can be reached at [phone].    Action Taken: Other: eye    Travel Screening: Not Applicable

## 2025-06-26 NOTE — TELEPHONE ENCOUNTER
Pt seen early May by provider for posterior vitreous detachment and concern of possible tear.  Pt was referred to another eye clinic/retina provider.    Pt called Dr. Nell Birch today stating has not been contacted for that exam.    Reviewed clinic able to see tomorrow in acute.    Triage team to reach out to review scheduling tomorrow AM with Dr. Ayala between 8:30 and 10 AM    Lawrence Villaseñor RN 10:56 AM 06/26/25

## 2025-06-26 NOTE — TELEPHONE ENCOUNTER
Spoke to pt at 1220    Pt really would like to see Dr. Flores for evaluation of posterior vitreous detachment that was first noted end of April and declined Acute eye care visit.    No vision changes/symptoms since onset-- more tearing.    Scheduled July 22nd with Dr. Flores in open new time slot using solutions.    Confirmed date/time/location/duration/hospital based clinic/main clinic number.    Reviewed to contact clinic if has any new floaters/flashing/vision changes between now and visit.    Lawrence Villaseñor RN 12:27 PM 06/26/25

## 2025-07-15 DIAGNOSIS — H43.811 PVD (POSTERIOR VITREOUS DETACHMENT), RIGHT: ICD-10-CM

## 2025-07-15 DIAGNOSIS — H43.393 VITREOUS SYNERESIS OF BOTH EYES: Primary | ICD-10-CM

## 2025-07-22 ENCOUNTER — OFFICE VISIT (OUTPATIENT)
Dept: OPHTHALMOLOGY | Facility: CLINIC | Age: 60
End: 2025-07-22
Attending: OPHTHALMOLOGY
Payer: COMMERCIAL

## 2025-07-22 DIAGNOSIS — H25.13 NUCLEAR SENILE CATARACT OF BOTH EYES: ICD-10-CM

## 2025-07-22 DIAGNOSIS — H43.391 VITREOUS OPACITIES OF RIGHT EYE: ICD-10-CM

## 2025-07-22 DIAGNOSIS — H43.811 PVD (POSTERIOR VITREOUS DETACHMENT), RIGHT: Primary | ICD-10-CM

## 2025-07-22 DIAGNOSIS — H43.811 POSTERIOR VITREOUS DEGENERATION, RIGHT: ICD-10-CM

## 2025-07-22 PROCEDURE — 92250 FUNDUS PHOTOGRAPHY W/I&R: CPT | Performed by: OPHTHALMOLOGY

## 2025-07-22 PROCEDURE — 99207 FUNDUS PHOTOS OU (BOTH EYES): CPT | Mod: 26 | Performed by: OPHTHALMOLOGY

## 2025-07-22 PROCEDURE — 92004 COMPRE OPH EXAM NEW PT 1/>: CPT | Performed by: OPHTHALMOLOGY

## 2025-07-22 PROCEDURE — 92134 CPTRZ OPH DX IMG PST SGM RTA: CPT | Performed by: OPHTHALMOLOGY

## 2025-07-22 PROCEDURE — 99214 OFFICE O/P EST MOD 30 MIN: CPT | Performed by: OPHTHALMOLOGY

## 2025-07-22 ASSESSMENT — CONF VISUAL FIELD
METHOD: COUNTING FINGERS
OD_INFERIOR_TEMPORAL_RESTRICTION: 3
OD_SUPERIOR_TEMPORAL_RESTRICTION: 3

## 2025-07-22 ASSESSMENT — VISUAL ACUITY
OD_SC+: -2
METHOD: SNELLEN - LINEAR
OD_PH_SC+: -1
OD_SC: 20/30
OS_PH_SC: 20/20
OS_PH_SC+: -2
OS_SC: 20/30
OD_PH_SC: 20/25

## 2025-07-22 ASSESSMENT — EXTERNAL EXAM - LEFT EYE: OS_EXAM: NORMAL

## 2025-07-22 ASSESSMENT — EXTERNAL EXAM - RIGHT EYE: OD_EXAM: NORMAL

## 2025-07-22 ASSESSMENT — TONOMETRY
IOP_METHOD: TONOPEN
OD_IOP_MMHG: 09
OS_IOP_MMHG: 10

## 2025-07-22 ASSESSMENT — SLIT LAMP EXAM - LIDS
COMMENTS: NORMAL
COMMENTS: NORMAL

## 2025-07-22 NOTE — PROGRESS NOTES
CC -   floaters and flashes right eye     INTERVAL HISTORY - Initial visit    HPI -   Ale Rose is a 59 year old patient is here for right eye floaters and flashes that started after a blunt head trauma ~3 months ago. Was seen at [facility] who confirmed there is no tears. There was  a retinal heme. Floaters are bothering and affecting her life and work.   She is here for a second opinion.   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 PVD (posterior vitreous detachment), right    2. Posterior vitreous degeneration, right    3. Nuclear senile cataract of both eyes    4. Vitreous opacities of right eye      Discussed the nature and course of the condition  Retina detachment precautions were discussed with the patient (presence or increased in flashes, floaters or a curtain in the visual field) and was asked to return if any of the those occur  Discussed tx options including observation vs PPV; is interested in PPV because the floaters are very bothersome and interfere with daily activities; after discussing risks vs benefits,  I asked her to think about it and we can consider PPV in the future        return to clinic: 4 months for DFE and OCT and optos ou     Complete documentation of historical and exam elements from today's encounter can be found in the full encounter summary report (not reduplicated in this progress note). I personally obtained the chief complaint(s) and history of present illness.  I confirmed and edited as necessary the review of systems, past medical/surgical history, family history, social history, and examination findings as documented by others; and I examined the patient myself. I personally reviewed the relevant tests, images, and reports as documented above. I formulated and edited  as necessary the assessment and plan and discussed the findings and management plan with the patient and family.     Sawyer Flores MD, PhD

## 2025-07-22 NOTE — NURSING NOTE
Chief Complaints and History of Present Illnesses   Patient presents with    Retinal Evaluation     2nd opinion  PVD/Retinal hemorrhage        Chief Complaint(s) and History of Present Illness(es)       Retinal Evaluation              Comments: 2nd opinion  PVD/Retinal hemorrhage                 Comments    Pt wants 2nd opinion on PVR right eye   States she butted heads with her daughter 4/25, 4 days later had flashes for a few days   Floaters also started 4/25  States she has a center floater in the right eye that makes it hard to see  Occasional watering, and some dryness  No flashes, eye pain or redness    Dodie Cadet COT 1:51 PM July 22, 2025